# Patient Record
Sex: MALE | Race: WHITE | Employment: FULL TIME | URBAN - METROPOLITAN AREA
[De-identification: names, ages, dates, MRNs, and addresses within clinical notes are randomized per-mention and may not be internally consistent; named-entity substitution may affect disease eponyms.]

---

## 2017-06-09 ENCOUNTER — OFFICE VISIT - HEALTHEAST (OUTPATIENT)
Dept: FAMILY MEDICINE | Facility: CLINIC | Age: 22
End: 2017-06-09

## 2017-06-09 ENCOUNTER — HOSPITAL ENCOUNTER (OUTPATIENT)
Dept: ULTRASOUND IMAGING | Facility: HOSPITAL | Age: 22
Discharge: HOME | End: 2017-06-09
Attending: FAMILY MEDICINE

## 2017-06-09 ENCOUNTER — COMMUNICATION - HEALTHEAST (OUTPATIENT)
Dept: TELEHEALTH | Facility: CLINIC | Age: 22
End: 2017-06-09

## 2017-06-09 ENCOUNTER — RECORDS - HEALTHEAST (OUTPATIENT)
Dept: GENERAL RADIOLOGY | Facility: CLINIC | Age: 22
End: 2017-06-09

## 2017-06-09 DIAGNOSIS — R11.2 NAUSEA WITH VOMITING, UNSPECIFIED: ICD-10-CM

## 2017-06-09 DIAGNOSIS — R10.9 UNSPECIFIED ABDOMINAL PAIN: ICD-10-CM

## 2017-06-09 DIAGNOSIS — R63.4 ABNORMAL WEIGHT LOSS: ICD-10-CM

## 2017-06-09 DIAGNOSIS — R11.2 NAUSEA & VOMITING: ICD-10-CM

## 2017-06-09 DIAGNOSIS — R10.9 ABDOMINAL PAIN: ICD-10-CM

## 2017-06-09 DIAGNOSIS — R63.4 WEIGHT LOSS: ICD-10-CM

## 2017-06-09 ASSESSMENT — MIFFLIN-ST. JEOR: SCORE: 1930.19

## 2017-06-14 ENCOUNTER — COMMUNICATION - HEALTHEAST (OUTPATIENT)
Dept: FAMILY MEDICINE | Facility: CLINIC | Age: 22
End: 2017-06-14

## 2017-06-14 DIAGNOSIS — R63.4 WEIGHT LOSS, UNINTENTIONAL: ICD-10-CM

## 2018-09-13 ENCOUNTER — OFFICE VISIT - HEALTHEAST (OUTPATIENT)
Dept: FAMILY MEDICINE | Facility: CLINIC | Age: 23
End: 2018-09-13

## 2018-09-13 ENCOUNTER — COMMUNICATION - HEALTHEAST (OUTPATIENT)
Dept: TELEHEALTH | Facility: CLINIC | Age: 23
End: 2018-09-13

## 2018-09-13 DIAGNOSIS — M79.671 RIGHT FOOT PAIN: ICD-10-CM

## 2018-09-13 ASSESSMENT — MIFFLIN-ST. JEOR: SCORE: 2170.6

## 2018-12-20 ENCOUNTER — OFFICE VISIT - HEALTHEAST (OUTPATIENT)
Dept: FAMILY MEDICINE | Facility: CLINIC | Age: 23
End: 2018-12-20

## 2018-12-20 DIAGNOSIS — R11.10 VOMITING AND DIARRHEA: ICD-10-CM

## 2018-12-20 DIAGNOSIS — R19.7 VOMITING AND DIARRHEA: ICD-10-CM

## 2018-12-20 LAB
ANION GAP SERPL CALCULATED.3IONS-SCNC: 12 MMOL/L (ref 5–18)
BASOPHILS # BLD AUTO: 0 THOU/UL (ref 0–0.2)
BASOPHILS NFR BLD AUTO: 1 % (ref 0–2)
BUN SERPL-MCNC: 17 MG/DL (ref 8–22)
CHLORIDE BLD-SCNC: 106 MMOL/L (ref 98–107)
CO2 SERPL-SCNC: 25 MMOL/L (ref 22–31)
CREAT SERPL-MCNC: 0.9 MG/DL (ref 0.8–1.5)
EOSINOPHIL # BLD AUTO: 0.2 THOU/UL (ref 0–0.4)
EOSINOPHIL NFR BLD AUTO: 3 % (ref 0–6)
ERYTHROCYTE [DISTWIDTH] IN BLOOD BY AUTOMATED COUNT: 10.8 % (ref 11–14.5)
GLUCOSE BLD-MCNC: 107 MG/DL (ref 70–125)
HCT VFR BLD AUTO: 45.2 % (ref 40–54)
HGB BLD-MCNC: 15.9 G/DL (ref 14–18)
LIPASE SERPL-CCNC: 15 U/L (ref 0–52)
LYMPHOCYTES # BLD AUTO: 1.7 THOU/UL (ref 0.8–4.4)
LYMPHOCYTES NFR BLD AUTO: 27 % (ref 20–40)
MCH RBC QN AUTO: 33.6 PG (ref 27–34)
MCHC RBC AUTO-ENTMCNC: 35.2 G/DL (ref 32–36)
MCV RBC AUTO: 95 FL (ref 80–100)
MONOCYTES # BLD AUTO: 0.4 THOU/UL (ref 0–0.9)
MONOCYTES NFR BLD AUTO: 6 % (ref 2–10)
NEUTROPHILS # BLD AUTO: 3.9 THOU/UL (ref 2–7.7)
NEUTROPHILS NFR BLD AUTO: 63 % (ref 50–70)
PLATELET # BLD AUTO: 228 THOU/UL (ref 140–440)
PMV BLD AUTO: 7.9 FL (ref 7–10)
POTASSIUM BLD-SCNC: 4.5 MMOL/L (ref 3.5–5.5)
RBC # BLD AUTO: 4.74 MILL/UL (ref 4.4–6.2)
SODIUM SERPL-SCNC: 143 MMOL/L (ref 136–145)
WBC: 6.3 THOU/UL (ref 4–11)

## 2019-02-14 ENCOUNTER — COMMUNICATION - HEALTHEAST (OUTPATIENT)
Dept: FAMILY MEDICINE | Facility: CLINIC | Age: 24
End: 2019-02-14

## 2019-03-21 ENCOUNTER — COMMUNICATION - HEALTHEAST (OUTPATIENT)
Dept: FAMILY MEDICINE | Facility: CLINIC | Age: 24
End: 2019-03-21

## 2019-04-02 ENCOUNTER — OFFICE VISIT - HEALTHEAST (OUTPATIENT)
Dept: FAMILY MEDICINE | Facility: CLINIC | Age: 24
End: 2019-04-02

## 2019-04-02 DIAGNOSIS — Z63.79 OTHER STRESSFUL LIFE EVENTS AFFECTING FAMILY AND HOUSEHOLD: ICD-10-CM

## 2019-04-02 DIAGNOSIS — J45.990 EXERCISE-INDUCED ASTHMA: ICD-10-CM

## 2019-04-02 DIAGNOSIS — E66.01 CLASS 3 SEVERE OBESITY WITHOUT SERIOUS COMORBIDITY IN ADULT, UNSPECIFIED BMI, UNSPECIFIED OBESITY TYPE (H): ICD-10-CM

## 2019-04-02 DIAGNOSIS — F32.0 CURRENT MILD EPISODE OF MAJOR DEPRESSIVE DISORDER WITHOUT PRIOR EPISODE (H): ICD-10-CM

## 2019-04-02 DIAGNOSIS — Z00.00 ANNUAL PHYSICAL EXAM: ICD-10-CM

## 2019-04-02 DIAGNOSIS — J45.909 ASTHMA: ICD-10-CM

## 2019-04-02 DIAGNOSIS — K59.09 OTHER CONSTIPATION: ICD-10-CM

## 2019-04-02 DIAGNOSIS — E66.813 CLASS 3 SEVERE OBESITY WITHOUT SERIOUS COMORBIDITY IN ADULT, UNSPECIFIED BMI, UNSPECIFIED OBESITY TYPE (H): ICD-10-CM

## 2019-04-02 RX ORDER — OMEGA-3S/DHA/EPA/FISH OIL/D3 300MG-1000
500 CAPSULE ORAL DAILY
Status: SHIPPED | COMMUNITY
Start: 2019-04-02 | End: 2023-01-09

## 2019-04-02 RX ORDER — ALBUTEROL SULFATE 90 UG/1
1-2 AEROSOL, METERED RESPIRATORY (INHALATION) EVERY 4 HOURS PRN
Qty: 1 INHALER | Refills: 11 | Status: SHIPPED | OUTPATIENT
Start: 2019-04-02 | End: 2023-01-09

## 2019-04-02 ASSESSMENT — MIFFLIN-ST. JEOR: SCORE: 2168.53

## 2019-04-22 ENCOUNTER — COMMUNICATION - HEALTHEAST (OUTPATIENT)
Dept: FAMILY MEDICINE | Facility: CLINIC | Age: 24
End: 2019-04-22

## 2019-09-23 ENCOUNTER — COMMUNICATION - HEALTHEAST (OUTPATIENT)
Dept: FAMILY MEDICINE | Facility: CLINIC | Age: 24
End: 2019-09-23

## 2020-01-23 ENCOUNTER — OFFICE VISIT - HEALTHEAST (OUTPATIENT)
Dept: FAMILY MEDICINE | Facility: CLINIC | Age: 25
End: 2020-01-23

## 2020-01-23 DIAGNOSIS — J11.1 INFLUENZA-LIKE ILLNESS: ICD-10-CM

## 2020-01-23 ASSESSMENT — MIFFLIN-ST. JEOR: SCORE: 2229.29

## 2020-03-13 ENCOUNTER — RECORDS - HEALTHEAST (OUTPATIENT)
Dept: ADMINISTRATIVE | Facility: OTHER | Age: 25
End: 2020-03-13

## 2021-04-05 ENCOUNTER — HOSPITAL ENCOUNTER (EMERGENCY)
Facility: CLINIC | Age: 26
Discharge: HOME | End: 2021-04-05
Attending: EMERGENCY MEDICINE | Admitting: EMERGENCY MEDICINE

## 2021-04-05 ENCOUNTER — RECORDS - HEALTHEAST (OUTPATIENT)
Dept: ADMINISTRATIVE | Facility: OTHER | Age: 26
End: 2021-04-05

## 2021-04-05 VITALS
BODY MASS INDEX: 35.78 KG/M2 | HEIGHT: 73 IN | RESPIRATION RATE: 20 BRPM | TEMPERATURE: 98 F | HEART RATE: 71 BPM | DIASTOLIC BLOOD PRESSURE: 85 MMHG | SYSTOLIC BLOOD PRESSURE: 117 MMHG | OXYGEN SATURATION: 100 % | WEIGHT: 270 LBS

## 2021-04-05 DIAGNOSIS — R55 SYNCOPE, UNSPECIFIED SYNCOPE TYPE: ICD-10-CM

## 2021-04-05 LAB
ALBUMIN SERPL-MCNC: 3.8 G/DL (ref 3.4–5)
ALP SERPL-CCNC: 87 U/L (ref 40–150)
ALT SERPL W P-5'-P-CCNC: 43 U/L (ref 0–70)
ANION GAP SERPL CALCULATED.3IONS-SCNC: 4 MMOL/L (ref 3–14)
AST SERPL W P-5'-P-CCNC: 28 U/L (ref 0–45)
BASOPHILS # BLD AUTO: 0.1 10E9/L (ref 0–0.2)
BASOPHILS NFR BLD AUTO: 1.1 %
BILIRUB SERPL-MCNC: 1.1 MG/DL (ref 0.2–1.3)
BUN SERPL-MCNC: 16 MG/DL (ref 7–30)
CALCIUM SERPL-MCNC: 9.1 MG/DL (ref 8.5–10.1)
CHLORIDE SERPL-SCNC: 107 MMOL/L (ref 94–109)
CO2 SERPL-SCNC: 25 MMOL/L (ref 20–32)
CREAT SERPL-MCNC: 0.94 MG/DL (ref 0.66–1.25)
DIFFERENTIAL METHOD BLD: NORMAL
EOSINOPHIL # BLD AUTO: 0.1 10E9/L (ref 0–0.7)
EOSINOPHIL NFR BLD AUTO: 1.4 %
ERYTHROCYTE [DISTWIDTH] IN BLOOD BY AUTOMATED COUNT: 12.5 % (ref 10–15)
GFR SERPL CREATININE-BSD FRML MDRD: >90 ML/MIN/{1.73_M2}
GLUCOSE SERPL-MCNC: 80 MG/DL (ref 70–99)
HCT VFR BLD AUTO: 48 % (ref 40–53)
HGB BLD-MCNC: 16.6 G/DL (ref 13.3–17.7)
IMM GRANULOCYTES # BLD: 0 10E9/L (ref 0–0.4)
IMM GRANULOCYTES NFR BLD: 0.1 %
LYMPHOCYTES # BLD AUTO: 3 10E9/L (ref 0.8–5.3)
LYMPHOCYTES NFR BLD AUTO: 32 %
MCH RBC QN AUTO: 31.7 PG (ref 26.5–33)
MCHC RBC AUTO-ENTMCNC: 34.6 G/DL (ref 31.5–36.5)
MCV RBC AUTO: 92 FL (ref 78–100)
MONOCYTES # BLD AUTO: 0.7 10E9/L (ref 0–1.3)
MONOCYTES NFR BLD AUTO: 8 %
NEUTROPHILS # BLD AUTO: 5.3 10E9/L (ref 1.6–8.3)
NEUTROPHILS NFR BLD AUTO: 57.4 %
NRBC # BLD AUTO: 0 10*3/UL
NRBC BLD AUTO-RTO: 0 /100
PLATELET # BLD AUTO: 281 10E9/L (ref 150–450)
POTASSIUM SERPL-SCNC: 4 MMOL/L (ref 3.4–5.3)
PROT SERPL-MCNC: 7.7 G/DL (ref 6.8–8.8)
RBC # BLD AUTO: 5.24 10E12/L (ref 4.4–5.9)
SODIUM SERPL-SCNC: 137 MMOL/L (ref 133–144)
WBC # BLD AUTO: 9.2 10E9/L (ref 4–11)

## 2021-04-05 PROCEDURE — 99284 EMERGENCY DEPT VISIT MOD MDM: CPT | Mod: 25 | Performed by: EMERGENCY MEDICINE

## 2021-04-05 PROCEDURE — 80053 COMPREHEN METABOLIC PANEL: CPT | Performed by: EMERGENCY MEDICINE

## 2021-04-05 PROCEDURE — 85025 COMPLETE CBC W/AUTO DIFF WBC: CPT | Performed by: EMERGENCY MEDICINE

## 2021-04-05 PROCEDURE — 93005 ELECTROCARDIOGRAM TRACING: CPT | Performed by: EMERGENCY MEDICINE

## 2021-04-05 PROCEDURE — 99284 EMERGENCY DEPT VISIT MOD MDM: CPT | Performed by: EMERGENCY MEDICINE

## 2021-04-05 PROCEDURE — 93010 ELECTROCARDIOGRAM REPORT: CPT | Performed by: EMERGENCY MEDICINE

## 2021-04-05 ASSESSMENT — MIFFLIN-ST. JEOR: SCORE: 2263.59

## 2021-04-05 NOTE — LETTER
April 5, 2021      To Whom It May Concern:      John Jeter was seen in our Emergency Department today, 04/05/21.  I expect his condition to improve over the next 3 days.  He may return to work/school when improved.    Sincerely,        Clement Pope MD        
No

## 2021-04-05 NOTE — ED TRIAGE NOTES
"THis afternoon taking leash off dog stood up and passed out. States he \"aimee held onto the wall and went down slowly. States he was out for about 30 minutes. Friend found him. Denies neck pain, having left shoulder, arm right leg pain.  "

## 2021-04-06 LAB — INTERPRETATION ECG - MUSE: NORMAL

## 2021-04-06 NOTE — DISCHARGE INSTRUCTIONS
Please make an appointment to follow up with Your Primary Care Provider as soon as possible.  Results for orders placed or performed during the hospital encounter of 04/05/21   Comprehensive metabolic panel     Status: None   Result Value Ref Range    Sodium 137 133 - 144 mmol/L    Potassium 4.0 3.4 - 5.3 mmol/L    Chloride 107 94 - 109 mmol/L    Carbon Dioxide 25 20 - 32 mmol/L    Anion Gap 4 3 - 14 mmol/L    Glucose 80 70 - 99 mg/dL    Urea Nitrogen 16 7 - 30 mg/dL    Creatinine 0.94 0.66 - 1.25 mg/dL    GFR Estimate >90 >60 mL/min/[1.73_m2]    GFR Estimate If Black >90 >60 mL/min/[1.73_m2]    Calcium 9.1 8.5 - 10.1 mg/dL    Bilirubin Total 1.1 0.2 - 1.3 mg/dL    Albumin 3.8 3.4 - 5.0 g/dL    Protein Total 7.7 6.8 - 8.8 g/dL    Alkaline Phosphatase 87 40 - 150 U/L    ALT 43 0 - 70 U/L    AST 28 0 - 45 U/L   CBC with platelets differential     Status: None   Result Value Ref Range    WBC 9.2 4.0 - 11.0 10e9/L    RBC Count 5.24 4.4 - 5.9 10e12/L    Hemoglobin 16.6 13.3 - 17.7 g/dL    Hematocrit 48.0 40.0 - 53.0 %    MCV 92 78 - 100 fl    MCH 31.7 26.5 - 33.0 pg    MCHC 34.6 31.5 - 36.5 g/dL    RDW 12.5 10.0 - 15.0 %    Platelet Count 281 150 - 450 10e9/L    Diff Method Automated Method     % Neutrophils 57.4 %    % Lymphocytes 32.0 %    % Monocytes 8.0 %    % Eosinophils 1.4 %    % Basophils 1.1 %    % Immature Granulocytes 0.1 %    Nucleated RBCs 0 0 /100    Absolute Neutrophil 5.3 1.6 - 8.3 10e9/L    Absolute Lymphocytes 3.0 0.8 - 5.3 10e9/L    Absolute Monocytes 0.7 0.0 - 1.3 10e9/L    Absolute Eosinophils 0.1 0.0 - 0.7 10e9/L    Absolute Basophils 0.1 0.0 - 0.2 10e9/L    Abs Immature Granulocytes 0.0 0 - 0.4 10e9/L    Absolute Nucleated RBC 0.0    EKG 12-lead, tracing only     Status: None (Preliminary result)   Result Value Ref Range    Interpretation ECG Click View Image link to view waveform and result

## 2021-04-06 NOTE — ED PROVIDER NOTES
"    [place] EMERGENCY DEPARTMENT ([facility])  4/05/21      History     Chief Complaint   Patient presents with     Loss of Consciousness     The history is provided by the patient, medical records and a friend.     John Jeter is a 25 year old male with a medical history significant for depression and  exercise-induced asthma, who presents to the emergency department for evaluation following a syncopal episode in which patient was unconscious for about 30 minutes.  Patient states he bent down to take the leash off of his dog after going on a walk and stood up with the intent to go back to work prior to fainting.  Patient notes he woke up to his friend running through the door saying his name.  Patient notes this has happened previously (about 4 years ago) and was worked up with a CT, MRI, EKG, physicality tests, and EEG through [facility].  Patient denies any episodes of fainting since then.  He notes episodes in which he will \"have a faint feeling\" upon standing up after sitting down.  He states that his \"brain sometimes gets fuzzy\".  His friend notes that he has been disoriented for the past few days. He feels confused at work and  \"does not feel like he is all there\". Patient states he felt like everything was processing slower and he was not moving at his normal pace.  He denies fever, cough, recent colds, or diarrhea.      Per review of the patient's medical record, they were seen at [facility]'s ED on 4/20/2016 for evaluation of a syncopal episode. Per patient's parents he was previously having syncopal episodes like this approximately every 1-2 weeks after his postconcussive syndrome. Patient was worked up with an EKG which did not show any evidence of long QT or arrhythmia. Patient was discharged in stable improved condition.       US Abdomen Complete 6/9/2017  FINDINGS: Normal abdominal ultrasound.    XR Chest PA and Lateral 6/9/2017  FINDINGS: Negative chest.    XR Abdomen AP " "6/9/2017  FINDINGS: Negative abdomen. Bowel gas pattern is normal. Nothing for obstruction. No evidence for renal stones.    History reviewed. No pertinent past medical history.    History reviewed. No pertinent surgical history.    No family history on file.    Social History     Tobacco Use     Smoking status: Current Every Day Smoker     Smokeless tobacco: Never Used   Substance Use Topics     Alcohol use: Yes     Comment: rare       No current facility-administered medications for this encounter.      No current outpatient medications on file.      Not on File       Review of Systems  A complete review of systems was performed with pertinent positives and negatives noted in the HPI, and all other systems negative.    Physical Exam   BP: 127/81  Pulse: 74  Temp: 98  F (36.7  C)  Resp: 14  Height: 185.4 cm (6' 1\")  Weight: 122.5 kg (270 lb)  SpO2: 96 %  Physical Exam  Vitals signs and nursing note reviewed.   Constitutional:       General: He is not in acute distress.     Appearance: He is well-developed. He is not ill-appearing, toxic-appearing or diaphoretic.      Comments: Comfortably resting, lying in bed, NAD, nondiaphoretic, lucid, fully conversant, no  respiratory distress, alert and oriented.     HENT:      Head: Normocephalic and atraumatic.   Eyes:      General: No scleral icterus.     Pupils: Pupils are equal, round, and reactive to light.   Neck:      Musculoskeletal: Normal range of motion and neck supple.   Cardiovascular:      Rate and Rhythm: Normal rate.      Heart sounds: Normal heart sounds.   Pulmonary:      Effort: No respiratory distress.      Breath sounds: Normal breath sounds.   Abdominal:      General: Bowel sounds are normal.      Palpations: Abdomen is soft.      Tenderness: There is no abdominal tenderness.   Musculoskeletal:         General: No tenderness.   Skin:     General: Skin is warm and dry.      Findings: No rash.   Neurological:      General: No focal deficit present.      " Mental Status: He is alert and oriented to person, place, and time. Mental status is at baseline.      Comments: Patient nerves II through XII were tested and intact.  Extraocular muscles intact, without any nystagmus.  Strength in all 4 extremities with 5/5 and sensation was intact in all extremities.   gait normal.           ED Course     9:34 PM  The patient was seen and examined by Clement Pope MD in Room ED21.     Procedures             EKG Interpretation:      Interpreted by Clement Pope MD  Time reviewed:   Symptoms at time of EKG: Asymptomatic  Rhythm: normal sinus   Rate: normal  Axis: normal  Ectopy: none  Conduction: normal  ST Segments/ T Waves: No ST-T wave changes  Q Waves: none  Comparison to prior: No old EKG available    Clinical Impression: normal EKG             Results for orders placed or performed during the hospital encounter of 04/05/21   Comprehensive metabolic panel     Status: None   Result Value Ref Range    Sodium 137 133 - 144 mmol/L    Potassium 4.0 3.4 - 5.3 mmol/L    Chloride 107 94 - 109 mmol/L    Carbon Dioxide 25 20 - 32 mmol/L    Anion Gap 4 3 - 14 mmol/L    Glucose 80 70 - 99 mg/dL    Urea Nitrogen 16 7 - 30 mg/dL    Creatinine 0.94 0.66 - 1.25 mg/dL    GFR Estimate >90 >60 mL/min/[1.73_m2]    GFR Estimate If Black >90 >60 mL/min/[1.73_m2]    Calcium 9.1 8.5 - 10.1 mg/dL    Bilirubin Total 1.1 0.2 - 1.3 mg/dL    Albumin 3.8 3.4 - 5.0 g/dL    Protein Total 7.7 6.8 - 8.8 g/dL    Alkaline Phosphatase 87 40 - 150 U/L    ALT 43 0 - 70 U/L    AST 28 0 - 45 U/L   CBC with platelets differential     Status: None   Result Value Ref Range    WBC 9.2 4.0 - 11.0 10e9/L    RBC Count 5.24 4.4 - 5.9 10e12/L    Hemoglobin 16.6 13.3 - 17.7 g/dL    Hematocrit 48.0 40.0 - 53.0 %    MCV 92 78 - 100 fl    MCH 31.7 26.5 - 33.0 pg    MCHC 34.6 31.5 - 36.5 g/dL    RDW 12.5 10.0 - 15.0 %    Platelet Count 281 150 - 450 10e9/L    Diff Method Automated Method     % Neutrophils 57.4 %    %  Lymphocytes 32.0 %    % Monocytes 8.0 %    % Eosinophils 1.4 %    % Basophils 1.1 %    % Immature Granulocytes 0.1 %    Nucleated RBCs 0 0 /100    Absolute Neutrophil 5.3 1.6 - 8.3 10e9/L    Absolute Lymphocytes 3.0 0.8 - 5.3 10e9/L    Absolute Monocytes 0.7 0.0 - 1.3 10e9/L    Absolute Eosinophils 0.1 0.0 - 0.7 10e9/L    Absolute Basophils 0.1 0.0 - 0.2 10e9/L    Abs Immature Granulocytes 0.0 0 - 0.4 10e9/L    Absolute Nucleated RBC 0.0    EKG 12-lead, tracing only     Status: None (Preliminary result)   Result Value Ref Range    Interpretation ECG Click View Image link to view waveform and result      Medications - No data to display     Assessments & Plan (with Medical Decision Making)   This is a 25-year-old male with a significant past medical history of recurrent syncopal episodes presents today after also having recurrent episode of syncope.  Here he is vitally stable.  ECG is normal sinus rhythm without signs of ectopy or ischemia.  His QTC is within normal limits and his QRS is not widened.  His neuro exam is completely unremarkable.  His labs are also reviewed.  I do not see any acute processes at this time however the patient does have a history of syncope which had a very extensive work-up in the past.  I do recommend that he return to his primary care provider to let them know that he is having similar symptoms.  Pt was discharged home/self-care.  PT was provided written discharge instructions. Additionally verbal instructions were given and discussed with patient.  PT was asked to return to the ED immediately for any new or concerning symptoms.  Pt was in agreement, endorsed understanding, and questions were answered.  I have reviewed the nursing notes. I have reviewed the findings, diagnosis, plan and need for follow up with the patient.    New Prescriptions    No medications on file       Final diagnoses:   Syncope, unspecified syncope type     I, Gali Booker, am serving as a trained medical  scribe to document services personally performed by Clement Pope MD based on the provider's statements to me on April 5, 2021.  This document has been checked and approved by the attending provider.    I, Clement Pope MD, was physically present and have reviewed and verified the accuracy of this note documented by Gali Booker, medical scribe.      --  Clement Pope MD  [facility] EMERGENCY DEPARTMENT  4/5/2021     Clement Pope MD  04/05/21 [id number]

## 2021-04-26 ENCOUNTER — OFFICE VISIT - HEALTHEAST (OUTPATIENT)
Dept: FAMILY MEDICINE | Facility: CLINIC | Age: 26
End: 2021-04-26

## 2021-04-26 DIAGNOSIS — F90.9 ATTENTION DEFICIT HYPERACTIVITY DISORDER (ADHD), UNSPECIFIED ADHD TYPE: ICD-10-CM

## 2021-04-26 DIAGNOSIS — R19.8 IRREGULAR BOWEL HABITS: ICD-10-CM

## 2021-04-26 DIAGNOSIS — F32.0 CURRENT MILD EPISODE OF MAJOR DEPRESSIVE DISORDER, UNSPECIFIED WHETHER RECURRENT (H): ICD-10-CM

## 2021-04-26 DIAGNOSIS — R55 SYNCOPE, UNSPECIFIED SYNCOPE TYPE: ICD-10-CM

## 2021-04-26 DIAGNOSIS — J45.990 EXERCISE-INDUCED ASTHMA: ICD-10-CM

## 2021-04-26 ASSESSMENT — MIFFLIN-ST. JEOR: SCORE: 2375.36

## 2021-04-26 ASSESSMENT — PATIENT HEALTH QUESTIONNAIRE - PHQ9: SUM OF ALL RESPONSES TO PHQ QUESTIONS 1-9: 5

## 2021-05-20 ENCOUNTER — RECORDS - HEALTHEAST (OUTPATIENT)
Dept: ADMINISTRATIVE | Facility: OTHER | Age: 26
End: 2021-05-20

## 2021-05-20 ENCOUNTER — OFFICE VISIT - HEALTHEAST (OUTPATIENT)
Dept: FAMILY MEDICINE | Facility: CLINIC | Age: 26
End: 2021-05-20

## 2021-05-20 DIAGNOSIS — F32.0 CURRENT MILD EPISODE OF MAJOR DEPRESSIVE DISORDER, UNSPECIFIED WHETHER RECURRENT (H): ICD-10-CM

## 2021-05-20 DIAGNOSIS — Z82.49 FAMILY HISTORY OF LEFT VENTRICULAR NON-COMPACTION (LVNC): ICD-10-CM

## 2021-05-20 DIAGNOSIS — Z13.1 SCREENING FOR DIABETES MELLITUS: ICD-10-CM

## 2021-05-20 DIAGNOSIS — Z23 NEED FOR TD VACCINE: ICD-10-CM

## 2021-05-20 DIAGNOSIS — R55 SYNCOPE, UNSPECIFIED SYNCOPE TYPE: ICD-10-CM

## 2021-05-20 DIAGNOSIS — Z83.3 FAMILY HISTORY OF DIABETES MELLITUS: ICD-10-CM

## 2021-05-20 DIAGNOSIS — R19.5 MUCUS IN STOOL: ICD-10-CM

## 2021-05-20 DIAGNOSIS — E55.9 VITAMIN D DEFICIENCY: ICD-10-CM

## 2021-05-20 DIAGNOSIS — H53.9 VISION CHANGES: ICD-10-CM

## 2021-05-20 DIAGNOSIS — R10.11 RUQ ABDOMINAL PAIN: ICD-10-CM

## 2021-05-20 DIAGNOSIS — Z00.00 ANNUAL PHYSICAL EXAM: ICD-10-CM

## 2021-05-20 DIAGNOSIS — R19.8 IRREGULAR BOWEL HABITS: ICD-10-CM

## 2021-05-20 DIAGNOSIS — Z11.59 NEED FOR HEPATITIS C SCREENING TEST: ICD-10-CM

## 2021-05-20 DIAGNOSIS — Z11.59 NEED FOR HEPATITIS B SCREENING TEST: ICD-10-CM

## 2021-05-20 DIAGNOSIS — Z11.4 ENCOUNTER FOR SCREENING FOR HIV: ICD-10-CM

## 2021-05-20 LAB
ALBUMIN SERPL-MCNC: 3.5 G/DL (ref 3.5–5)
ALP SERPL-CCNC: 86 U/L (ref 45–120)
ALT SERPL W P-5'-P-CCNC: <45 U/L (ref 0–45)
ANION GAP SERPL CALCULATED.3IONS-SCNC: 9 MMOL/L (ref 5–18)
AST SERPL W P-5'-P-CCNC: 18 U/L (ref 0–40)
BILIRUB SERPL-MCNC: 0.9 MG/DL (ref 0–1)
BUN SERPL-MCNC: 11 MG/DL (ref 8–22)
CALCIUM SERPL-MCNC: 8.7 MG/DL (ref 8.5–10.5)
CHLORIDE BLD-SCNC: 109 MMOL/L (ref 98–107)
CHOLEST SERPL-MCNC: 143 MG/DL
CK SERPL-CCNC: 250 U/L (ref 30–190)
CO2 SERPL-SCNC: 22 MMOL/L (ref 22–31)
CREAT SERPL-MCNC: 0.79 MG/DL (ref 0.7–1.3)
FASTING STATUS PATIENT QL REPORTED: NO
GFR SERPL CREATININE-BSD FRML MDRD: >60 ML/MIN/1.73M2
GLUCOSE BLD-MCNC: 90 MG/DL (ref 70–125)
HBA1C MFR BLD: 5.3 %
HDLC SERPL-MCNC: 45 MG/DL
HIV 1+2 AB+HIV1 P24 AG SERPL QL IA: NEGATIVE
LDLC SERPL CALC-MCNC: 86 MG/DL
LIPASE SERPL-CCNC: 20 U/L (ref 0–52)
POTASSIUM BLD-SCNC: 4.4 MMOL/L (ref 3.5–5)
PROT SERPL-MCNC: 6.2 G/DL (ref 6–8)
SODIUM SERPL-SCNC: 140 MMOL/L (ref 136–145)
TRIGL SERPL-MCNC: 59 MG/DL
TSH SERPL DL<=0.005 MIU/L-ACNC: 0.56 UIU/ML (ref 0.3–5)

## 2021-05-20 ASSESSMENT — MIFFLIN-ST. JEOR: SCORE: 2300.16

## 2021-05-21 ENCOUNTER — RECORDS - HEALTHEAST (OUTPATIENT)
Dept: ADMINISTRATIVE | Facility: OTHER | Age: 26
End: 2021-05-21

## 2021-05-21 LAB
25(OH)D3 SERPL-MCNC: 15.1 NG/ML (ref 30–80)
HBV CORE AB SERPL QL IA: NEGATIVE
HBV SURFACE AG SERPL QL IA: NEGATIVE
HCV AB SERPL QL IA: NEGATIVE
HEPATITIS B SURFACE ANTIBODY LHE- HISTORICAL: POSITIVE

## 2021-05-27 VITALS
HEART RATE: 68 BPM | SYSTOLIC BLOOD PRESSURE: 106 MMHG | DIASTOLIC BLOOD PRESSURE: 74 MMHG | TEMPERATURE: 98.2 F | RESPIRATION RATE: 18 BRPM

## 2021-05-27 VITALS — HEIGHT: 72 IN | BODY MASS INDEX: 37.67 KG/M2 | WEIGHT: 281.13 LBS | BODY MASS INDEX: 37.09 KG/M2

## 2021-05-27 NOTE — PROGRESS NOTES
"MALE PREVENTATIVE EXAM    Assessment and Plan:   Annual Exam -   Will have him RTC for labs in a few months when he has insurance.       Exercise Induced Asthma  ACT 18  Action plan filled out today  Refilled ventolin inhaler     Major depression   Filled out form for \"reasonable accomodation\" for an animal . See scanned form  He will schedule psychotherapy once he has insurance.      Eczema - resolved with avoiding triggers and regular use of lotion and hydrocorticosone cream    Constipation and possible hemorrhoids  Discussed home remedies and OTC options while he is uninsured.   Prune juice, pineapple juice, metamucil, benefiber  Prep H cream for hemorrhoids  Will reassess at f/u appt          Next follow up:  No Follow-up on file.    Immunization Review  Adult Imm Review: Declines immunizations today  BMI: 33.6      I discussed the following with the patient:   Adult Healthy Living: Importance of regular exercise  Healthy nutrition  Stress management      Subjective:   Chief Complaint: John Jeter is an 23 y.o. male here for a preventative health visit.     HPI:      Asthma   ACT 18 today  Exercise triggers sx      depression  Feeling depressed and anxious about stressors in the family. His grandmother had several strokes and his mother had to take over responsibility for the teenage siblings. His uncle is an alcoholic and having psychosis symptoms and despite ER visits and trying to access mental health services in [facility], he has struggled to get the help he needs. John has been trying to help with the family issues. 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 He is currently working as manager at a [employer]. He has been sleeping poorly with trouble falling asleep. He has apathy and unmotivated to do anything. He has anhedonia and not enjoying his friends and video games that he usually enjoys. He had an episode a few months ago of " drinking hard liquor and then blacking out and was told he got violent and passed out, so he stopped drinking. He lives with his girlfriend and she has concerns about his depression. He tries to eat healthy and exercise to help. He wants to got to therapy but waiting until he can get insurance, which is possible in a few months with his job. He also wants to have an animal  to help with this stress.He is wanting to stop his use of pot as a coping strategy.  He currently has an dog that is trained as a  and it is helping but he cannot keep it in his apartment, unless a medical form is filled out. He is not interested in starting medications unless things worsen. Denies suicidal thoughts.     PHQ9 = 9, very difficult    Eczema - improved significantly by avoiding triggers    Possible hemorrhoids from intermittent constipation  He drinks lots of water and eating more fruits and vegetables.   Maroon and dark streaks in his stool periodically.       Healthy Habits  Are you taking a daily aspirin? No  Do you typically exercising at least 40 min, 3-4 times per week?  Yes  Do you usually eat at least 4 servings of fruit and vegetables a day, include whole grains and fiber and avoid regularly eating high fat foods? Yes  Have you had an eye exam in the past two years? Yes  Do you see a dentist twice per year? Yes  Do you have any concerns regarding sleep? YES    Safety Screen  If you own firearms, are they secured in a locked gun cabinet or with trigger locks? Does not own firearms  Do you feel you are safe where you are living?: Yes (4/2/2019 10:57 AM)  Do you feel you are safe in your relationship(s)?: Yes (4/2/2019 10:57 AM)      Review of Systems:  Please see above.  The rest of the review of systems are negative for all systems.     Cancer Screening     Patient has no health maintenance due at this time          Patient Care Team:  Tiffany Shah MD as PCP - General        History     Reviewed By  "Date/Time Sections Reviewed    Humaira Lee CMA 4/2/2019 11:04 AM Tobacco, Alcohol, Drug Use, Sexual Activity    Humaira Lee [facility] 4/2/2019 11:03 AM Tobacco            Objective:   Vital Signs:   Visit Vitals  /68   Pulse 90   Temp 99.3  F (37.4  C) (Oral)   Resp 14   Ht 6' 0.44\" (1.84 m)   Wt (!) 251 lb (113.9 kg)   SpO2 96%   BMI 33.63 kg/m           PHYSICAL EXAM  OBJECTIVE:  Vitals listed above within normal limits  General appearance: well groomed, pleasant, well hydrated, nontoxic appearing  ENT: PERRL, throat clear  Neck: neck supple, no lymphadenopathy, no thyromegaly  Lungs: lungs clear to auscultation bilaterally, no wheezes or rhonchi  Heart: regular rate and rhythm, no murmurs, rubs or gallops  Abdomen: soft, nontender  Neuro: no focal deficits, CN II-XII grossly intact, alert and oriented  Psych:  mood stable, appears to have good insight and judgment          "

## 2021-05-28 ASSESSMENT — ASTHMA QUESTIONNAIRES: ACT_TOTALSCORE: 24

## 2021-05-31 VITALS — BODY MASS INDEX: 27.09 KG/M2 | WEIGHT: 200 LBS | HEIGHT: 72 IN

## 2021-06-01 ENCOUNTER — RECORDS - HEALTHEAST (OUTPATIENT)
Dept: ADMINISTRATIVE | Facility: CLINIC | Age: 26
End: 2021-06-01

## 2021-06-02 VITALS — WEIGHT: 251 LBS | BODY MASS INDEX: 34 KG/M2 | HEIGHT: 72 IN

## 2021-06-02 VITALS — BODY MASS INDEX: 32.42 KG/M2 | WEIGHT: 252.5 LBS

## 2021-06-02 VITALS — HEIGHT: 74 IN | BODY MASS INDEX: 31.57 KG/M2 | WEIGHT: 246 LBS

## 2021-06-11 NOTE — PROGRESS NOTES
OFFICE VISIT NOTE      Assessment & Plan   John Jeter is a 21 y.o. male who does not come to the doctor but who has a significant story:  100# unintentional weight loss in the past year  Nausea x 2 years  Occasional black and tarry stools  Wait for labs + get abd u/s; might need upper endoscopy    I spent some time explaining what things I'm worried about with him AND encouraging him to follow up to figure out what is going on.    His girlfriend who accompanied him is supportive of him pursuing care/help.    Diagnoses and all orders for this visit:    Abdominal pain  -     Urinalysis-UC if Indicated  -     HM1(CBC and Differential)  -     Basic Metabolic Panel  -     Hepatic Profile  -     Amylase  -     Lipase  -     H. pylori Antibody, IgG  -     XR Chest PA and Lateral  -     XR Abdomen AP; Future; Expected date: 6/9/17  -     HM1 (CBC with Diff)  -     Occult Blood, Fecal  -     Culture, Urine  -     US Abdomen Complete; Future; Expected date: 6/9/17    Weight loss  -     Urinalysis-UC if Indicated  -     HM1(CBC and Differential)  -     Basic Metabolic Panel  -     Hepatic Profile  -     Amylase  -     Lipase  -     H. pylori Antibody, IgG  -     XR Chest PA and Lateral  -     XR Abdomen AP; Future; Expected date: 6/9/17  -     HM1 (CBC with Diff)  -     Occult Blood, Fecal  -     Culture, Urine  -     US Abdomen Complete; Future; Expected date: 6/9/17    Nausea & vomiting  -     Urinalysis-UC if Indicated  -     HM1(CBC and Differential)  -     Basic Metabolic Panel  -     Hepatic Profile  -     Amylase  -     Lipase  -     H. pylori Antibody, IgG  -     XR Chest PA and Lateral  -     XR Abdomen AP; Future; Expected date: 6/9/17  -     HM1 (CBC with Diff)  -     Occult Blood, Fecal  -     Culture, Urine  -     US Abdomen Complete; Future; Expected date: 6/9/17        Ann Bañuelos MD            Subjective:   Chief Complaint:  Fatigue (with headCHES ) and Dizziness (WITH NAUSEA )    21 y.o. male.     1)  "nausea x 2 years  Past month - worse  First AM - lots of activity  Also like this after he eats  Has lost about 100#  Thought this was part of puberty  No worms ub stool and no blood, sometimes black and tarry - last time was a month ago    Headaches - more recent, head feels very heavy  Is eating, eating \"all the time\"  Coffee makes his stomach worse, cheese and dairy - feels bad/slimy after eating it    Stress - no correlation; this all started with the beginning of his first job; now the job is not stressful    Mental function - memory down a bit  Sleep - sometimes tossing and turning      Current Outpatient Prescriptions   Medication Sig     albuterol (PROAIR HFA) 90 mcg/actuation inhaler Inhale 1-2 puffs every 4 (four) hours as needed for wheezing or shortness of breath (cough).     LORATADINE ORAL Take by mouth.     hydrOXYzine (ATARAX) 25 MG tablet TAKE 1 TO 2 TABLETS BY MOUTH AT BEDTIME AS NEEDED FOR ITCHING       PSFHx: appropriate sections of PMH completed/filled in   Tobacco Status:  He  reports that he has quit smoking. His smoking use included Cigarettes. He does not have any smokeless tobacco history on file.    Review of Systems:  No fever.  No rash.    Objective:    /60  Pulse (!) 56  Temp 98.5  F (36.9  C) (Oral)   Resp 14  Ht 6' (1.829 m)  Wt 200 lb (90.7 kg)  SpO2 95%  BMI 27.12 kg/m2  GENERAL: No acute distress.  HEENT: eyes clear; TMs clear; throat with mild erythema, no exudates  NECK: supple, no LA or masses  LUNGS: clear with good aeration  Chest: right side of ribs are tender with palpation  HT: reg s1s2  Abd: +BS, soft, moderately tender diffusely, no masses, no percussion tenderness, no psoas sign  Skin: no rash, extra skin noted from his weight loss  Moves around easily    Labs reviewed  CXR clear  KUB clear  Greater than 60 minutes spent with patient, with greater than 50% of time spent in counseling, consultation and care coordination regarding problems detailed above.  "

## 2021-06-14 ENCOUNTER — COMMUNICATION - HEALTHEAST (OUTPATIENT)
Dept: FAMILY MEDICINE | Facility: CLINIC | Age: 26
End: 2021-06-14

## 2021-06-14 ENCOUNTER — AMBULATORY - HEALTHEAST (OUTPATIENT)
Dept: FAMILY MEDICINE | Facility: CLINIC | Age: 26
End: 2021-06-14

## 2021-06-14 DIAGNOSIS — E55.9 VITAMIN D DEFICIENCY: ICD-10-CM

## 2021-06-14 RX ORDER — ERGOCALCIFEROL 1.25 MG/1
50000 CAPSULE ORAL WEEKLY
Qty: 4 CAPSULE | Refills: 12 | Status: SHIPPED | OUTPATIENT
Start: 2021-06-14 | End: 2021-09-12

## 2021-06-16 PROBLEM — F32.0 CURRENT MILD EPISODE OF MAJOR DEPRESSIVE DISORDER WITHOUT PRIOR EPISODE (H): Status: ACTIVE | Noted: 2019-04-02

## 2021-06-16 PROBLEM — K59.09 OTHER CONSTIPATION: Status: ACTIVE | Noted: 2019-04-02

## 2021-06-16 PROBLEM — Z63.79 OTHER STRESSFUL LIFE EVENTS AFFECTING FAMILY AND HOUSEHOLD: Status: ACTIVE | Noted: 2019-04-02

## 2021-06-16 PROBLEM — J45.990 EXERCISE-INDUCED ASTHMA: Status: ACTIVE | Noted: 2019-04-02

## 2021-06-17 ENCOUNTER — OFFICE VISIT - HEALTHEAST (OUTPATIENT)
Dept: FAMILY MEDICINE | Facility: CLINIC | Age: 26
End: 2021-06-17

## 2021-06-17 DIAGNOSIS — Z63.79 OTHER STRESSFUL LIFE EVENTS AFFECTING FAMILY AND HOUSEHOLD: ICD-10-CM

## 2021-06-17 DIAGNOSIS — F32.0 CURRENT MILD EPISODE OF MAJOR DEPRESSIVE DISORDER WITHOUT PRIOR EPISODE (H): ICD-10-CM

## 2021-06-17 DIAGNOSIS — R55 SYNCOPE, UNSPECIFIED SYNCOPE TYPE: ICD-10-CM

## 2021-06-17 DIAGNOSIS — R74.8 ELEVATED CK: ICD-10-CM

## 2021-06-17 DIAGNOSIS — Z82.49 FAMILY HISTORY OF LEFT VENTRICULAR NON-COMPACTION (LVNC): ICD-10-CM

## 2021-06-17 DIAGNOSIS — R19.5 MUCUS IN STOOL: ICD-10-CM

## 2021-06-17 ASSESSMENT — MIFFLIN-ST. JEOR: SCORE: 2294.16

## 2021-06-17 NOTE — PATIENT INSTRUCTIONS - HE
Patient Instructions by Harlan Casas PA-C at 1/23/2020 11:20 AM     Author: Harlan Casas PA-C Service: -- Author Type: Physician Assistant    Filed: 1/23/2020 11:59 AM Encounter Date: 1/23/2020 Status: Addendum    : Harlan Casas PA-C (Physician Assistant)    Related Notes: Original Note by Harlan Casas PA-C (Physician Assistant) filed at 1/23/2020 11:58 AM       You will be treated for the flu. You are contagious until your fever is gone for 24 hours. Maintain good hand hygiene, cover your cough, and limit contact to prevent spreading the illness. Symptoms typically last 1-2 weeks.    Symptom management:  - Drink plenty of fluids and allow for plenty of rest  - Use tylenol or ibuprofen every 4-6 hours for fever/discomfort    Reasons to be seen immediately for re-evaluation:  - Have trouble breathing or are short of breath  - Feel pain or pressure in your chest or belly  - Get suddenly dizzy  - Feel confused  - Have severe vomiting    If no symptom improvement in 1 week, follow-up with your primary care provider.      Patient Education     Influenza (Adult)    Influenza is also called the flu. It is a viral illness that affects the air passages of your lungs. It is different from the common cold. The flu can easily be passed from one to person to another. It may be spread through the air by coughing and sneezing. Or it can be spread by touching the sick person and then touching your own eyes, nose, or mouth.  The flu starts 1 to 3 days after you are exposed to the flu virus. It may last for 1 to 2 weeks but many people feel tired or fatigued for many weeks afterward. You usually dont need to take antibiotics unless you have a complication. This might be an ear or sinus infection or pneumonia.  Symptoms of the flu may be mild or severe. They can include extreme tiredness (wanting to stay in bed all day), chills, fevers, muscle aches, soreness with eye movement, headache, and a dry, hacking cough.  Home  care  Follow these guidelines when caring for yourself at home:    Avoid being around cigarette smoke, whether yours or other peoples.    Acetaminophen or ibuprofen will help ease your fever, muscle aches, and headache. Dont give aspirin to anyone younger than 18 who has the flu. Aspirin can harm the liver.    Nausea and loss of appetite are common with the flu. Eat light meals. Drink 6 to 8 glasses of liquids every day. Good choices are water, sport drinks, soft drinks without caffeine, juices, tea, and soup. Extra fluids will also help loosen secretions in your nose and lungs.    Over-the-counter cold medicines will not make the flu go away faster. But the medicines may help with coughing, sore throat, and congestion in your nose and sinuses. Dont use a decongestant if you have high blood pressure.    Stay home until your fever has been gone for at least 24 hours without using medicine to reduce fever.  Follow-up care  Follow up with your healthcare provider, or as advised, if you are not getting better over the next week.  If you are age 65 or older, talk with your provider about getting a pneumococcal vaccine every 5 years. You should also get this vaccine if you have chronic asthma or COPD. All adults should get a flu vaccine every fall. Ask your provider about this.  When to seek medical advice  Call your healthcare provider right away if any of these occur:    Cough with lots of colored mucus (sputum) or blood in your mucus    Chest pain, shortness of breath, wheezing, or trouble breathing    Severe headache, or face, neck, or ear pain    New rash with fever    Fever of 100.4 F (38 C) or higher, or as directed by your healthcare provider    Confusion, behavior change, or seizure    Severe weakness or dizziness    You get a new fever or cough after getting better for a few days  Date Last Reviewed: 1/1/2017 2000-2017 [employer]. [address] [facility], [place] [address]. All rights reserved.  This information is not intended as a substitute for professional medical care. Always follow your healthcare professional's instructions.

## 2021-06-17 NOTE — PROGRESS NOTES
"    Assessment & Plan     Syncope, unspecified syncope type  He is going to try to have his brother get the records from his work-up done at the [facility] for the syncope and genetic disorder.  If he can get those records he will bring them to his follow-up appointment so we can evaluate if this is something he should be tested for himself.   Ending on those records, may consider head CT and neuro consult or genetic consultation    Exercise-induced asthma  No symptoms for years.  Refilled albuterol inhaler to have as needed    Current mild episode of major depressive disorder, unspecified whether recurrent (H)  Attention deficit hyperactivity disorder (ADHD), unspecified ADHD type  Referral for counseling.  He does not feel he needs medications at this time but would like some support to help with some of his stressors and mood.  PHQ-9 is 5 which is down from 9 in 2019.  And he denies suicidal ideation  - AMB REFERRAL TO MENTAL HEALTH AND ADDICTION  - Adult (18+); Outpatient Treatment; Individual/Couples/Family/Group Therapy/Health Psychology; [facility]; Any Clinic Location; We will contact you to schedule the appointment or plea...      Irregular bowel habits  We will evaluate this further and consider IBS at his next appointment        31 minutes spent on the date of the encounter doing chart review, history and exam, documentation and further activities per the note      Return in about 3 weeks (around 5/17/2021) for Annual physical follow-up on syncope.    Tiffany Shah MD  [facility] ANGELLALAGABY Lopez ANN MARIE Steffany is 25 y.o. and presents today for the following health issues   HPI   Here for ER appt and syncope    ER 4/5/2021 notes reviewed:  Syncope, unspecified syncope type   \"significant past medical history of recurrent syncopal episodes presents today after also having recurrent episode of syncope. Here he is vitally stable. ECG is normal sinus rhythm " "without signs of ectopy or ischemia. His QTC is within normal limits and his QRS is not widened. His neuro exam is completely unremarkable. His labs are also reviewed. I do not see any acute processes at this time however the patient does have a history of syncope which had a very extensive work-up in the past\"    Initial 4/20/2016 for evaluation of a syncopal episode    FMH -   Brother has genetic mutation with similar sx - randomly faints and LOC and has to go to hospital - occurring almost 5 x per week  He was tested at [place] but never got final assessment b/c the doctor left [place].   Brother moved out of state since then.     Father also has faint feelings and head rush and foggy - similar sx but less extreme      Asthma   Act 24  And reports no asthma symptoms for years  NO MEDS      He is requesting a referral for counselor to deal with depression and possible ADHD.  He has had a fair amount of family stressors childhood traumas.    He has some questions about possible IBS because he has irregular bowel movements.  Sometimes he has a bowel movement for 2-3 times a day and other times he does have a bowel movement for 3 days.  And he has periodic abdominal pain.  He wants to address this at his next appointment      Covid vaccines  #1 on 4/7/2021 and #2 is tomorrow on 4/27/2021    He is due for his tetanus shot.      Social  He lives with his girlfriend they are engaged to get  at the end of December.  They both work at coffee shop.    Review of Systems   Please see above.  The rest of the review of systems are negative for all systems.         Objective    /70   Pulse 65   Temp 98.6  F (37  C) (Oral)   Resp 14   Ht 6' 0.44\" (1.84 m)   Wt (!) 297 lb 11.2 oz (135 kg)   SpO2 97%   BMI 39.89 kg/m    Body mass index is 39.89 kg/m .  Physical Exam    Vitals:    04/26/21 1025   BP: 106/70   Pulse: 65   Resp: 14   Temp: 98.6  F (37  C)   TempSrc: Oral   SpO2: 97%   Weight: (!) 297 lb 11.2 oz (135 kg) " "  Height: 6' 0.44\" (1.84 m)     OBJECTIVE:  Vitals listed above within normal limits  General appearance: well groomed, pleasant, well hydrated, nontoxic appearing  ENT: PERRL, throat clear  Neck: neck supple, no lymphadenopathy, no thyromegaly  Lungs: lungs clear to auscultation bilaterally, no wheezes or rhonchi  Heart: regular rate and rhythm, no murmurs, rubs or gallops  Abdomen: soft, nontender  Psych:  mood stable, appears to have good insight and judgment  Neuro: no focal deficits, CN II-XII grossly intact, alert and oriented                "

## 2021-06-19 NOTE — LETTER
Letter by Tiffany Shah MD at      Author: Tiffany Shah MD Service: -- Author Type: --    Filed:  Encounter Date: 9/23/2019 Status: Signed       September 23, 2019    Dear John Jeter:    On review of your records, we have found a gap in your health care services.  Based on your age and health history, we recommend the following service/s:  - Well child check  - Pap smear  - Colon cancer screening  - Mammogram  - Immunization/s  - Diabetes check  - Blood pressure/heart check  - Asthma check  - Depression check  - Cholesterol test  - Lab test  - Medication check  - Medicare Annual Wellness Exam    Please call us at [phone] to make an appointment for the above service/s.  If you had had the service done elsewhere, please let us know so that we can update our records.  If you have transferred care to another clinic, please let us know so that we can remove you from our list of current patients.   We believe that a strong preventive health program that includes regular visits and follow up care is an important part of your health and are committed to assisting you in being as healthy as you can be.     Sincerely,     [facility] [place]

## 2021-06-19 NOTE — LETTER
Letter by Tiffany Shah MD at      Author: Tiffany Shah MD Service: -- Author Type: --    Filed:  Encounter Date: 4/22/2019 Status: (Other)         Asthma Action Plan    Patient Name: John Jeter  Patient YOB: 1995    Doctor's Name: Tiffany Shah    Emergency Contact:              Severity Classification: Intermittent    What triggers my asthma: exercise    GREEN ZONE: Doing Well   No cough, wheeze, chest tightness or shortness of breath during the day or night  Can do your usual activities    Take these medicines before exercise if your asthma is exercise-induced:  Medicine How Much to Take When to take it   albuterol  (also known as ProAir, Ventolin and Proventil) 2 puffs 15-30 minutes prior to exercise or sports     YELLOW ZONE: Asthma is Getting Worse   Cough, wheeze, chest tightness or shortness of breath or  Waking at night due to asthma, or  Can do some, but not all, usual activities.    Keep taking green zone medications and add quick-relief medicine:  Quick Relief Medicine How Much to Take When to take it   albuterol  (also known as ProAir, Ventolin and Proventil) 2 puffs every 4 hours as needed     If you do not feel better and your symptoms do not return to the green zone after one hour of the quick relief medication, then:    Take quick relief treatment again. Call your clinician within 1 hour.    Contact your clinician if you are using quick relief medication more than 2 times per week.    RED ZONE: Medical Alert!   Very short of breath, or  Quick relief medications have not helped, or  Cannot do usual activities, or  Symptoms are same or worse after 24 hours in the Yellow Zone.    Continue green zone medicines and add:  Quick Relief Medicine Dose When to take it   albuterol  (also known as ProAir, Ventolin and Proventil) 2 puffs may repeat every 20 minutes for up to 1 hour     IF ANY OF THESE ARE HAPPENING, SEEK EMERGENCY HELP AND CALL 911!   You are struggling to  breathe and are uncomfortable or  There is simply no clear improvement and you are worried about how to get through the next 30 minutes or  Trouble walking and talking due to shortness of breath, or  Lips or fingernails are blue    Provider signature:  Electronically Signed by Tiffany Shah   Date: 04/22/19

## 2021-06-20 NOTE — PROGRESS NOTES
"Subjective:      Patient ID: John Jeter is a 22 y.o. male.    Chief Complaint:    HPI John Jeter is a 22 y.o. male who presents today complaining of right foot injury x 2 days.  Patient states that he has been walking around excessively on old shoes for the past few weeks and then 2 days ago he was getting out of a truck and he felt his right ankle and foot pop and spring up.  He did have excruciating pain at that time, but then yesterday morning he woke up with severe pain on the lateral aspect of the right foot and ankle.  He states that the ankle swelled significantly at that time also.  He has been icing  which has brought the swelling down.  He is also been taking ibuprofen.  He was having difficulty bearing weight yesterday, but is slightly better today.  He states that it is painful when he tries to point his foot and he has some pain on the plantar surface when he tries to flex his foot back.  He has a history of fractures in the right foot and toe.      Social History   Substance Use Topics     Smoking status: Former Smoker     Types: Cigarettes     Smokeless tobacco: Never Used      Comment: 1-2 cigarettes per day      Alcohol use Yes      Comment: OCC ALCOHOL       Review of Systems   Musculoskeletal: Positive for arthralgias (Right foot and ankle pain), gait problem and joint swelling.   Skin: Negative for color change and rash.   All other systems reviewed and are negative.      Objective:     /75  Pulse 92  Temp 98  F (36.7  C) (Oral)   Resp 16  Ht 6' 2\" (1.88 m)  Wt (!) 246 lb (111.6 kg)  SpO2 99%  BMI 31.58 kg/m2    Physical Exam   Constitutional: He appears well-developed and well-nourished. No distress.   HENT:   Head: Normocephalic and atraumatic.   Right Ear: External ear normal.   Left Ear: External ear normal.   Eyes: Conjunctivae are normal.   Pulmonary/Chest: Effort normal. No respiratory distress.   Musculoskeletal:        Right ankle: He exhibits normal range of motion, " no swelling, no deformity and no laceration. Tenderness. AITFL, CF ligament, posterior TFL and head of 5th metatarsal tenderness found. No lateral malleolus, no medial malleolus and no proximal fibula tenderness found. Achilles tendon exhibits no pain, no defect and normal Victor's test results.        Right foot: There is no tenderness and no swelling.   Skin: He is not diaphoretic.   Psychiatric: He has a normal mood and affect. His behavior is normal. Judgment and thought content normal.   Nursing note and vitals reviewed.    Radiology:  I have personally ordered and preliminarily reviewed the following xray, I have noted no sign of fracture.  Xr Ankle Right 3 Or More Vws    Result Date: 9/13/2018  EXAM DATE:         09/13/2018 [facility] X-RAY ANKLE RIGHT, MINIMUM THREE VIEWS 9/13/2018 1:45 PM INDICATION: Pain on lateral aspect of foot and ankle. Swelling COMPARISON: None. FINDINGS: No fracture or dislocation. Ankle mortise is intact. No soft tissue swelling around the ankle.     Xr Foot Right 3 Or More Vws    Result Date: 9/13/2018  EXAM DATE:         09/13/2018 [facility] X-RAY FOOT RIGHT, MINIMUM 3 VIEWS 9/13/2018 1:30 PM INDICATION: Pain on lateral aspect of foot and ankle. Swelling COMPARISON: None. FINDINGS: No fracture or dislocation. No significant degenerative changes. Small plantar calcaneal spur.     Clinical Decision Making:  Fracture ruled out with x-ray of foot and ankle today.  Suspect sprain of ATFL and PTFL considering swelling this happened shortly after the injury and lo.  Patient was given a Tri-Lock ankle brace over support and compression today.  He is able to bear weight without severe pain. Recommended new shoes and some decreased walking at work until symptoms resolve. I suspect promising disposition considering how quickly his swelling has improved.      Assessment:     Procedures    1. Right foot pain  XR Foot Right 3 or More VWS    XR Ankle Right  3 or More VWS    Ankle/Foot DME: Ankle Brace; Right         Patient Instructions   1) Ibuprofen 800 mg three times daily with food for as needed for pain.  2) Ice to the affected area 20 minutes three times daily.  3) Wear the Tri Julieta brace for added support and compression once pain has resolved you can discontinue it's use. There are youtube videos online that show you how to put it on.  4) Get new supportive shoes if yours are very old or broken.   5) Follow up in 10-14 days if not improving, sooner if worsening.

## 2021-06-20 NOTE — LETTER
Letter by Harlan Casas PA-C at      Author: Harlan Casas PA-C Service: -- Author Type: --    Filed:  Encounter Date: 1/23/2020 Status: (Other)         January 23, 2020     Patient: John Jeter   YOB: 1995   Date of Visit: 1/23/2020       To Whom It May Concern:    It is my medical opinion that John Jeter may return to work on 1/25/2020.    If you have any questions or concerns, please don't hesitate to call.    Sincerely,        Electronically signed by Harlan Casas PA-C

## 2021-06-22 NOTE — PROGRESS NOTES
Chief Complaint   Patient presents with     Emesis     x 4 days, runy nose, fatigue, dizziness. has not had any OTC meds. state still has nausea and dizziness     Diarrhea     x 3 day statets yesterday was the last day       HPI:  John Jeter is a 23 y.o. male who presents today complaining of nausea, vomiting, fatigue, diarrhea, and dizziness for the past 4 days.  Patient had 3 days of diarrhea and is now resolved.  He continues to have nausea and dizziness.  He has not had any over-the-counter medications for his symptoms. He reports some generalized abdominal pain that improves after an episode of diarrhea or vomiting. He denies any recent travel outside of the [place], abx use, or adventurous or undercooked foods. He denies any sore throat or fevers. He had a small amount of blood in saliva once after vomiting, but no significant amount of blood in vomit. No blood in stool.        History obtained from the patient.    Problem List:  2016-10: Post concussive syndrome  Obesity  ADHD, Predominantly Inattentive Type  Acne  Asthma  Atopic Dermatitis  Eczema  Patient Education: Mental Health      Past Medical History:   Diagnosis Date     Concussion syndrome      Dermatitis      Weight loss 2017    2016-17 lost 100# unintentionally       Social History     Tobacco Use     Smoking status: Former Smoker     Types: Cigarettes     Smokeless tobacco: Never Used     Tobacco comment: 1-2 cigarettes per day    Substance Use Topics     Alcohol use: Yes     Comment: OCC ALCOHOL       Review of Systems   Constitutional: Negative for chills and fever.   HENT: Negative for sore throat.    Gastrointestinal: Positive for abdominal pain, diarrhea, nausea and vomiting. Negative for blood in stool.   Neurological: Positive for dizziness.       Vitals:    12/20/18 0906   BP: 116/68   Patient Site: Right Arm   Patient Position: Sitting   Cuff Size: Adult Large   Pulse: 100   Resp: 15   Temp: 98.9  F (37.2  C)   TempSrc: Oral   SpO2: 98%    Weight: (!) 252 lb 8 oz (114.5 kg)       Physical Exam   Constitutional: He appears well-developed and well-nourished. No distress.   HENT:   Head: Normocephalic and atraumatic.   Right Ear: External ear normal.   Left Ear: External ear normal.   Eyes: Conjunctivae are normal. Right eye exhibits no discharge. Left eye exhibits no discharge.   Neck: Normal range of motion.   Cardiovascular: Normal rate, regular rhythm and normal heart sounds.   Pulmonary/Chest: Effort normal and breath sounds normal. No respiratory distress.   Abdominal: Soft. He exhibits no distension. There is tenderness in the right upper quadrant, epigastric area, periumbilical area and left lower quadrant. There is no guarding.   Lymphadenopathy:     He has no cervical adenopathy.   Skin: He is not diaphoretic.   Psychiatric: He has a normal mood and affect. His behavior is normal. Judgment and thought content normal.         Labs:  Recent Results (from the past 72 hour(s))   ISTAT CHEM 7 (HE CLINIC ONLY)   Result Value Ref Range    Sodium 143 136 - 145 mmol/L    Potassium 4.5 3.5 - 5.5 mmol/L    CO2 25 22 - 31 mmol/L    Chloride 106 98 - 107 mmol/L    Anion Gap, Calculation 12 5 - 18 mmol/L    Glucose 107 70 - 125 mg/dL    BUN 17 8 - 22 mg/dL    Creatinine 0.90 0.80 - 1.50 mg/dL   Lipase   Result Value Ref Range    Lipase 15 0 - 52 U/L   HM1 (CBC with Diff)   Result Value Ref Range    WBC 6.3 4.0 - 11.0 thou/uL    RBC 4.74 4.40 - 6.20 mill/uL    Hemoglobin 15.9 14.0 - 18.0 g/dL    Hematocrit 45.2 40.0 - 54.0 %    MCV 95 80 - 100 fL    MCH 33.6 27.0 - 34.0 pg    MCHC 35.2 32.0 - 36.0 g/dL    RDW 10.8 (L) 11.0 - 14.5 %    Platelets 228 140 - 440 thou/uL    MPV 7.9 7.0 - 10.0 fL    Neutrophils % 63 50 - 70 %    Lymphocytes % 27 20 - 40 %    Monocytes % 6 2 - 10 %    Eosinophils % 3 0 - 6 %    Basophils % 1 0 - 2 %    Neutrophils Absolute 3.9 2.0 - 7.7 thou/uL    Lymphocytes Absolute 1.7 0.8 - 4.4 thou/uL    Monocytes Absolute 0.4 0.0 - 0.9  thou/uL    Eosinophils Absolute 0.2 0.0 - 0.4 thou/uL    Basophils Absolute 0.0 0.0 - 0.2 thou/uL     Clinical Decision Making:  I-STAT not indicative of severe dehydration today.  Lipase negative rule out of pancreatitis.  Low suspicion for appendicitis based on location of pain.  CBC was normal today.  Suspect viral gastroenteritis.  Description of throat bleeding does not sound consistent with Vidya-Juárez tears.  Patient was treated with Zofran for supportive cares.    At the end of the encounter, I discussed results, diagnosis, medications. Discussed red flags for immediate return to clinic/ER, as well as indications for follow up if no improvement. Patient understood and agreed to plan. Patient was stable for discharge.    1. Vomiting and diarrhea  ISTAT CHEM 7 ([facility] CLINIC ONLY)    HM1(CBC and Differential)    Lipase    HM1 (CBC with Diff)    ondansetron (ZOFRAN ODT) 4 MG disintegrating tablet         Patient Instructions   1. Your blood work was relatively normal today.  We will only call you regarding her lipase results if it is abnormal.  If you do not hear from us assume that it was normal.  2.  Begin taking Zofran as needed for nausea control.  Take small but frequent sips of clear fluids such as Powerade, Pedialyte, diluted juice, or water.  3.  Follow-up if your vomiting does not improve over the course the next 2 days.  5.  Low suspicion for any bacterial or parasitic infection considering your risk for history.  I most highly suspect this to be a viral stomach bug, which will likely resolve on its own.

## 2021-06-24 NOTE — TELEPHONE ENCOUNTER
Call patient to see if he is getting inhalers from another physician.   Is he still a patient here? Haven't seen him recently.  VM not set up yet  Will try at another time

## 2021-06-24 NOTE — TELEPHONE ENCOUNTER
Patient hasn't filled his inhalers or any asthma med for 4 years. Do you want to see him or take him off the adult asthma list?

## 2021-06-26 NOTE — PROGRESS NOTES
"    Assessment & Plan     Family history of left ventricular non-compaction (LVNC)  Syncope, unspecified syncope type  Elevated CK  Will check echo to r/o LVNC as cause of his syncope    - Echo Complete    Other stressful life events affecting family and household  Current mild episode of major depressive disorder without prior episode (H)  Awaiting mental health appointments scheduled for August    Mucus in stool  Awaiting GI consult scheduled for 7/1/21          BMI:   Estimated body mass index is 37.49 kg/m  as calculated from the following:    Height as of this encounter: 6' 0.44\" (1.84 m).    Weight as of this encounter: 279 lb 12.8 oz (126.9 kg).     Return in about 3 months (around 9/17/2021) for Recheck, for PTSD/MDD.    Tiffany Shah MD  [facility]   John Jeter is 25 y.o. and presents today for the following health issues   HPI   He is here to follow-up on labs and consults.    Irregular bowels and mucus in stool  The mucus and current jelly stool and irregular bowels concerning for inflammatory bowel disease like ulcerative colitis or Crohn's disease discussed with the patient he reviewed a colonoscopy to determine.  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  We reviewed the chart and he did have a normal echo in 2011.  But he still having these recurring episodes of feeling weak and lightheaded and has passed out periodically despite work-ups years ago with neuro and cardiology.  " We'll go ahead and order an echo to see if he has any evidence of this left ventricular noncompaction syndrome.    He has some depression and anxiety.  Afshan was ordered for mental health.  He has an assessment scheduled for August 3 and therapy to start virtually on August 10.  He says his employer  is offering access to [employer] which looks like an employment based coaching and mental health support program.  He sounded interested in this.  We did discuss that the coaching may not have the same depth as a mental health counselor but could help provide with building coping strategies.  It seems that he could benefit from doing both especially since the employment based one is free.    Vitamin D deficiency  His level was quite low.  He was not aware that I had sent the prescription for to start weekly ergocalciferol.  He is willing to start this    We reviewed all of his labs that he had done last visit.  Recent Results (from the past 1008 hour(s))   CK Total    Collection Time: 05/20/21  3:30 PM   Result Value Ref Range    CK, Total 250 (H) 30 - 190 U/L   Vitamin D, Total (25-Hydroxy)    Collection Time: 05/20/21  3:30 PM   Result Value Ref Range    Vitamin D, Total (25-Hydroxy) 15.1 (L) 30.0 - 80.0 ng/mL   Thyroid Cascade    Collection Time: 05/20/21  3:30 PM   Result Value Ref Range    TSH 0.56 0.30 - 5.00 uIU/mL   Glycosylated Hemoglobin A1c    Collection Time: 05/20/21  3:30 PM   Result Value Ref Range    Hemoglobin A1c 5.3 <=5.6 %   Lipid [place] FASTING    Collection Time: 05/20/21  3:30 PM   Result Value Ref Range    Cholesterol 143 <=199 mg/dL    Triglycerides 59 <=149 mg/dL    HDL Cholesterol 45 >=40 mg/dL    LDL Calculated 86 <=129 mg/dL    Patient Fasting > 8hrs? No    Comprehensive Metabolic Panel    Collection Time: 05/20/21  3:30 PM   Result Value Ref Range    Sodium 140 136 - 145 mmol/L    Potassium 4.4 3.5 - 5.0 mmol/L    Chloride 109 (H) 98 - 107 mmol/L    CO2 22 22 - 31 mmol/L    Anion Gap, Calculation  "9 5 - 18 mmol/L    Glucose 90 70 - 125 mg/dL    BUN 11 8 - 22 mg/dL    Creatinine 0.79 0.70 - 1.30 mg/dL    GFR MDRD Af Amer >60 >60 mL/min/1.73m2    GFR MDRD Non Af Amer >60 >60 mL/min/1.73m2    Bilirubin, Total 0.9 0.0 - 1.0 mg/dL    Calcium 8.7 8.5 - 10.5 mg/dL    Protein, Total 6.2 6.0 - 8.0 g/dL    Albumin 3.5 3.5 - 5.0 g/dL    Alkaline Phosphatase 86 45 - 120 U/L    AST 18 0 - 40 U/L    ALT <45 0 - 45 U/L   Lipase    Collection Time: 05/20/21  3:30 PM   Result Value Ref Range    Lipase 20 0 - 52 U/L   HIV Antigen/Antibody Screening [place]    Collection Time: 05/20/21  3:30 PM   Result Value Ref Range    HIV Antigen / Antibody Negative Negative   Hepatitis C Antibody (Anti-HCV)    Collection Time: 05/20/21  3:30 PM   Result Value Ref Range    Hepatitis C Ab Negative Negative   Hepatitis B Surface Antigen (HBsAG)    Collection Time: 05/20/21  3:30 PM   Result Value Ref Range    Hepatitis B Surface Ag Negative Negative   Hepatitis B Core Antibody (Anti-HBc)    Collection Time: 05/20/21  3:30 PM   Result Value Ref Range    Hep B Core Total Ab Negative Negative   Hepatitis B Surface Antibody (Anti-HBs)    Collection Time: 05/20/21  3:30 PM   Result Value Ref Range    Hep B Surface Antibody Positive (!) Negative         Review of Systems     Please see above.  The rest of the review of systems are negative for all systems.     Current Outpatient Medications   Medication Sig     albuterol (PROAIR HFA) 90 mcg/actuation inhaler Inhale 1-2 puffs every 4 (four) hours as needed for wheezing or shortness of breath (cough and before exercise).     cholecalciferol, vitamin D3, 400 unit Tab Take 500 Units by mouth daily.     ergocalciferol (ERGOCALCIFEROL) 1,250 mcg (50,000 unit) capsule Take 1 capsule (50,000 Units total) by mouth once a week.           Objective    /60   Pulse 86   Temp 98.2  F (36.8  C) (Oral)   Resp 14   Ht 6' 0.44\" (1.84 m)   Wt (!) 279 lb 12.8 oz (126.9 kg)   SpO2 95%   BMI 37.49 kg/m  " "  Body mass index is 37.49 kg/m .  Physical Exam    Vitals:    06/17/21 1151   BP: 104/60   Pulse: 86   Resp: 14   Temp: 98.2  F (36.8  C)   TempSrc: Oral   SpO2: 95%   Weight: (!) 279 lb 12.8 oz (126.9 kg)   Height: 6' 0.44\" (1.84 m)     PHYSICAL EXAM   General Appearance: Awake and alert, in no acute distress  HEENT: neck is supple  CV: regular rate  Resp: No respiratory distress. Breathing comfortably  Musculoskeletal: moving limbs comfortably with not deficits or deformities  Skin: no rashes noted        "

## 2021-06-28 NOTE — PROGRESS NOTES
Progress Notes by Harlan Casas PA-C at 1/23/2020 11:20 AM     Author: Harlan Casas PA-C Service: -- Author Type: Physician Assistant    Filed: 1/25/2020 10:20 AM Encounter Date: 1/23/2020 Status: Signed    : Harlan Casas PA-C (Physician Assistant)       Subjective:      Patient ID: John Jeter is a 24 y.o. male.    Chief Complaint:    HPI     John Jeter is a 24 y.o. male who presents today complaining of 4-day history of flu like symptoms to include cough that is productive of no phlegm.  He stated that he had a 2-day prodrome and then 2 days ago he had acute onset of headache sore throat myalgias arthralgias vomiting and diarrhea and loss of appetite.  He has had associated fatigue but no headache vomiting diarrhea that has persisted and no skin rash abdominal pain or urinary symptoms.  He had initially 4 days ago 2 days history of emesis and diarrhea.  That has abated.  She has now had fatigue and cough is the lingering symptom for the last 4 days.  He is continuing to take fluids well.  He is not having problematic fever.  No abdominal pain urinary symptoms skin rash to report.  He has not had antipyretic today.    Patient is requesting a note for work for today's office appointment in his illness.      Past Medical History:   Diagnosis Date   ? Concussion syndrome    ? Dermatitis    ? Eczema     Created by Conversion    ? Post concussive syndrome 10/6/2016    Three concussions due to football related injuries with only one time there was LOC of less than a minute. This occurred in middle school.    ? Weight loss 2017 2016-17 lost 100# unintentionally       No past surgical history on file.    No family history on file.    Social History     Tobacco Use   ? Smoking status: Current Every Day Smoker     Types: Cigarettes   ? Smokeless tobacco: Never Used   ? Tobacco comment: 1-2 cigarettes per day    Substance Use Topics   ? Alcohol use: No     Frequency: Never     Comment: OCC ALCOHOL   ? Drug  "use: Yes     Types: Marijuana       Review of Systems  As above in HPI, otherwise balance of Review of Systems are negative.    Objective:     /78 (Patient Site: Right Arm, Patient Position: Sitting, Cuff Size: Adult Large)   Pulse 70   Temp 98.6  F (37  C) (Oral)   Resp 16   Ht 6' 0.44\" (1.84 m)   Wt (!) 265 lb 8 oz (120.4 kg)   SpO2 98%   BMI 35.57 kg/m      Physical Exam  General: Patient is resting comfortably no acute distress is afebrile  HEENT: Head is normocephalic atraumatic   eyes are PERRL EOMI sclera anicteric   TMs are clear bilaterally  Throat is clear no exudate  No cervical lymphadenopathy present  LUNGS: Clear to auscultation bilaterally  HEART: Regular rate and rhythm  Skin: Without rash non-diaphoretic      Assessment:     Procedures    The encounter diagnosis was Influenza-like illness.    Plan:     1. Influenza-like illness  oseltamivir (TAMIFLU) 75 MG capsule       Had a conversation with the patient stating we will treat him for influenza-like illness.  He is started on the Tamiflu since his symptoms have worsened over the last 2 days.  I had a discussion that what sounded like a prodrome for the first 2 days with acute onset of all the symptoms 2 days later.  Risks and benefits of the Tamiflu medication were gone over indication for return was also gone over questions were answered to patient satisfaction before discharge.    Patient Instructions   You will be treated for the flu. You are contagious until your fever is gone for 24 hours. Maintain good hand hygiene, cover your cough, and limit contact to prevent spreading the illness. Symptoms typically last 1-2 weeks.    Symptom management:  - Drink plenty of fluids and allow for plenty of rest  - Use tylenol or ibuprofen every 4-6 hours for fever/discomfort    Reasons to be seen immediately for re-evaluation:  - Have trouble breathing or are short of breath  - Feel pain or pressure in your chest or belly  - Get suddenly dizzy  - " Feel confused  - Have severe vomiting    If no symptom improvement in 1 week, follow-up with your primary care provider.      Patient Education     Influenza (Adult)    Influenza is also called the flu. It is a viral illness that affects the air passages of your lungs. It is different from the common cold. The flu can easily be passed from one to person to another. It may be spread through the air by coughing and sneezing. Or it can be spread by touching the sick person and then touching your own eyes, nose, or mouth.  The flu starts 1 to 3 days after you are exposed to the flu virus. It may last for 1 to 2 weeks but many people feel tired or fatigued for many weeks afterward. You usually dont need to take antibiotics unless you have a complication. This might be an ear or sinus infection or pneumonia.  Symptoms of the flu may be mild or severe. They can include extreme tiredness (wanting to stay in bed all day), chills, fevers, muscle aches, soreness with eye movement, headache, and a dry, hacking cough.  Home care  Follow these guidelines when caring for yourself at home:    Avoid being around cigarette smoke, whether yours or other peoples.    Acetaminophen or ibuprofen will help ease your fever, muscle aches, and headache. Dont give aspirin to anyone younger than 18 who has the flu. Aspirin can harm the liver.    Nausea and loss of appetite are common with the flu. Eat light meals. Drink 6 to 8 glasses of liquids every day. Good choices are water, sport drinks, soft drinks without caffeine, juices, tea, and soup. Extra fluids will also help loosen secretions in your nose and lungs.    Over-the-counter cold medicines will not make the flu go away faster. But the medicines may help with coughing, sore throat, and congestion in your nose and sinuses. Dont use a decongestant if you have high blood pressure.    Stay home until your fever has been gone for at least 24 hours without using medicine to reduce  fever.  Follow-up care  Follow up with your healthcare provider, or as advised, if you are not getting better over the next week.  If you are age 65 or older, talk with your provider about getting a pneumococcal vaccine every 5 years. You should also get this vaccine if you have chronic asthma or COPD. All adults should get a flu vaccine every fall. Ask your provider about this.  When to seek medical advice  Call your healthcare provider right away if any of these occur:    Cough with lots of colored mucus (sputum) or blood in your mucus    Chest pain, shortness of breath, wheezing, or trouble breathing    Severe headache, or face, neck, or ear pain    New rash with fever    Fever of 100.4 F (38 C) or higher, or as directed by your healthcare provider    Confusion, behavior change, or seizure    Severe weakness or dizziness    You get a new fever or cough after getting better for a few days  Date Last Reviewed: 1/1/2017 2000-2017 [employer]. [address] [place] [address]. All rights reserved. This information is not intended as a substitute for professional medical care. Always follow your healthcare professional's instructions.

## 2021-06-30 NOTE — PROGRESS NOTES
Progress Notes by Tiffany Shah MD at 5/20/2021  2:40 PM     Author: Tiffany Shah MD Service: -- Author Type: Physician    Filed: 5/21/2021  1:04 AM Encounter Date: 5/20/2021 Status: Signed    : Tiffany Shah MD (Physician)       MALE PREVENTATIVE EXAM    Assessment and Plan:   1. Annual physical exam  Vaccines up-to-date including his Covid vaccine.  He had the Pfizer vaccine #1 on April 7 and #2 on April 27.  He will get his tetanus shot today  - Glycosylated Hemoglobin A1c  - Lipid [place] FASTING    2. Vision changes  - Ambulatory referral to Ophthalmology - [facility]    3. Need for Td vaccine  - Td, Preservative Free (green label)    4. Syncope, unspecified syncope type  Reviewed his brothers medical record for the left ventricular noncompaction cardiomyopathy.  He will see if he can get the work-up for himself done at the [facility] as part of his brothers evaluation and if not then I can refer him to cardiology and ordered cardiac echo.  We will check a CK level today since her dry blood    5. Current mild episode of major depressive disorder, unspecified whether recurrent (H)  Awaiting psychological evaluation scheduled for early August  - Thyroid Cascade    6. Family history of diabetes mellitus  We will check for diabetes given his family history of mother and father both having diabetes and his obesity  - Glycosylated Hemoglobin A1c    7. Family history of left ventricular non-compaction (LVNC)  We will check CK level and await to see if he can get an echo done at the [facility]  - CK Total    8. Vitamin D deficiency  - Vitamin D, Total (25-Hydroxy)    9. Screening for diabetes mellitus  - Glycosylated Hemoglobin A1c    10. BMI 37.0-37.9, adult  - Lipid [place] FASTING    11. RUQ abdominal pain  - Comprehensive Metabolic Panel  - Lipase  - US Abdomen Complete; Future    12. Irregular bowel habits   Mucus in stool  The mucus and current jelly stool and irregular  bowels concerning for inflammatory bowel disease like ulcerative colitis or Crohn's disease discussed with the patient he reviewed a colonoscopy to determine.  We will start with a GI consultation  - Ambulatory referral to Gastroenterology - [facility]    13. Need for hepatitis B screening test  He has an aunt with hepatitis B  - Hepatitis B Surface Antigen (HBsAG)  - Hepatitis B Core Antibody (Anti-HBc)  - Hepatitis B Surface Antibody (Anti-HBs)    14. Need for hepatitis C screening test  - Hepatitis C Antibody (Anti-HCV)    15. Encounter for screening for HIV  - HIV Antigen/Antibody Screening [place]            Next follow up:  Follow-up in 4 weeks to review labs and consultations    Immunization Review  Adult Imm Review: Due today, orders placed      I discussed the following with the patient:   Adult Healthy Living: Importance of regular exercise  Healthy nutrition        Subjective:   Chief Complaint: John Jeter is an 25 y.o. male here for a preventative health visit.    Patient has been advised of split billing requirements and indicates understanding: Yes  HPI:   Annual exam  Vaccines - due for Td  covid vaccine - pfizer x 2 at [facility]  (#1 4/7/21 and #2 on 4/27/21    Irregular bowel habits  BM 4 x per to every 3 days  No blood, black  Mucus and currant jelly   RUQ pressure and bloaty, when twists feels like ribs clicking     Unexplained syncope  Apparently his brother has this even more severely than he does.  His brother is getting a work-up at the [facility].  He was able to get the records from his brother so we could see if he had similar health issue.  The [facility] diagnosed him with arrhythmia and left ventricular noncompaction syndrome also known as LV and see or spongy myocardium.  His brother was noted to have increased LV trabeculation which is consistent with noncompaction cardiomyopathy.  Who underwent a genetic evaluation and there was no specific genetic mutation found for his brother.  " Apparently his dad has similar unexplained syncopal episodes and was getting some testing done at the [facility] as part of the brothers genetic work-up.  John thinks that he may be able to get a work-up and echo done at the [facility] also as part of his brothers genetic workup.  LVNC initial work-up includes EKG, echo, CK level      ADHD/MDD -   Psych referral ordered 4/26/21  appt scheduled for 8/3/21 (awaiting insurance)    Low Vit D 16.6 on 2015    BMI 37.7     Healthy Habits  Are you taking a daily aspirin? No  Do you typically exercising at least 40 min, 3-4 times per week?  Yes  Do you usually eat at least 4 servings of fruit and vegetables a day, include whole grains and fiber and avoid regularly eating high fat foods? Yes  Have you had an eye exam in the past two years? NO  Do you see a dentist twice per year? NO  Do you have any concerns regarding sleep? No    Safety Screen  If you own firearms, are they secured in a locked gun cabinet or with trigger locks? The patient does not own any firearms  Do you feel you are safe where you are living?: Yes (5/20/2021  2:27 PM)      Review of Systems:  Please see above.  The rest of the review of systems are negative for all systems.     Cancer Screening     Patient has no health maintenance due at this time          Patient Care Team:  Tiffany Shah MD as PCP - General  Tiffany Shah MD as Assigned PCP        History     Reviewed By Date/Time Sections Reviewed    Harriett Lamb MA 5/20/2021  2:28 PM Tobacco            Objective:   Vital Signs:   Visit Vitals  /74   Pulse 68   Temp 98.2  F (36.8  C) (Oral)   Resp 18   Ht 6' 0.44\" (1.84 m)   Wt (!) 281 lb 2 oz (127.5 kg)   BMI 37.67 kg/m           PHYSICAL EXAM  OBJECTIVE:  Vitals listed above within normal limits  General appearance: well groomed, pleasant, well hydrated, nontoxic appearing  ENT: PERRL, throat clear  Neck: neck supple, no lymphadenopathy, no thyromegaly  Lungs: lungs clear " to auscultation bilaterally, no wheezes or rhonchi  Heart: regular rate and rhythm, no murmurs, rubs or gallops  Abdomen: soft, diffuse abdominal tenderness worse in the left lower quadrant and right upper quadrant  Neuro: no focal deficits, CN II-XII grossly intact, alert and oriented  Psych:  mood stable, appears to have good insight and judgment               Medication List          Accurate as of May 20, 2021  2:57 PM. If you have any questions, ask your nurse or doctor.            CONTINUE taking these medications    albuterol 90 mcg/actuation inhaler  Also known as: ProAir HFA  INSTRUCTIONS: Inhale 1-2 puffs every 4 (four) hours as needed for wheezing or shortness of breath (cough and before exercise).        cholecalciferol (vitamin D3) 400 unit Tab  INSTRUCTIONS: Take 500 Units by mouth daily.               Additional Screenings Completed Today:

## 2021-07-03 NOTE — ADDENDUM NOTE
Addendum Note by Carolyn Ortiz MLT at 6/16/2017  2:33 PM     Author: Carolyn Ortiz MLT Service: -- Author Type:     Filed: 6/16/2017  2:33 PM Encounter Date: 6/9/2017 Status: Signed    : Carolyn Ortiz MLT ()    Addended by: CAROLYN ORTIZ on: 6/16/2017 02:33 PM        Modules accepted: Orders

## 2021-07-05 PROBLEM — Z82.49: Status: ACTIVE | Noted: 2021-06-17

## 2021-07-05 PROBLEM — R55 SYNCOPE, UNSPECIFIED SYNCOPE TYPE: Status: ACTIVE | Noted: 2021-06-17

## 2021-07-05 PROBLEM — R19.5 MUCUS IN STOOL: Status: ACTIVE | Noted: 2021-06-17

## 2021-07-05 PROBLEM — R74.8 ELEVATED CK: Status: ACTIVE | Noted: 2021-06-17

## 2021-08-03 ENCOUNTER — DOCUMENTATION ONLY (OUTPATIENT)
Dept: BEHAVIORAL HEALTH | Facility: CLINIC | Age: 26
End: 2021-08-03

## 2021-08-03 NOTE — PROGRESS NOTES
Patient was scheduled for an 8:00 initial assessment.  Unable to reach.  His VM was not set up.    Joy Sifuentes [facility]  8/3/21

## 2021-10-16 ENCOUNTER — HEALTH MAINTENANCE LETTER (OUTPATIENT)
Age: 26
End: 2021-10-16

## 2021-11-16 ASSESSMENT — PATIENT HEALTH QUESTIONNAIRE - PHQ9
SUM OF ALL RESPONSES TO PHQ QUESTIONS 1-9: 8
SUM OF ALL RESPONSES TO PHQ QUESTIONS 1-9: 8
10. IF YOU CHECKED OFF ANY PROBLEMS, HOW DIFFICULT HAVE THESE PROBLEMS MADE IT FOR YOU TO DO YOUR WORK, TAKE CARE OF THINGS AT HOME, OR GET ALONG WITH OTHER PEOPLE: SOMEWHAT DIFFICULT

## 2021-11-16 ASSESSMENT — ANXIETY QUESTIONNAIRES
7. FEELING AFRAID AS IF SOMETHING AWFUL MIGHT HAPPEN: NOT AT ALL
4. TROUBLE RELAXING: SEVERAL DAYS
GAD7 TOTAL SCORE: 7
1. FEELING NERVOUS, ANXIOUS, OR ON EDGE: MORE THAN HALF THE DAYS
6. BECOMING EASILY ANNOYED OR IRRITABLE: SEVERAL DAYS
7. FEELING AFRAID AS IF SOMETHING AWFUL MIGHT HAPPEN: NOT AT ALL
3. WORRYING TOO MUCH ABOUT DIFFERENT THINGS: SEVERAL DAYS
2. NOT BEING ABLE TO STOP OR CONTROL WORRYING: NOT AT ALL
5. BEING SO RESTLESS THAT IT IS HARD TO SIT STILL: MORE THAN HALF THE DAYS
GAD7 TOTAL SCORE: 7
GAD7 TOTAL SCORE: 7
8. IF YOU CHECKED OFF ANY PROBLEMS, HOW DIFFICULT HAVE THESE MADE IT FOR YOU TO DO YOUR WORK, TAKE CARE OF THINGS AT HOME, OR GET ALONG WITH OTHER PEOPLE?: SOMEWHAT DIFFICULT

## 2021-11-17 ENCOUNTER — HOSPITAL ENCOUNTER (OUTPATIENT)
Dept: BEHAVIORAL HEALTH | Facility: CLINIC | Age: 26
Discharge: HOME | End: 2021-11-17
Attending: FAMILY MEDICINE | Admitting: FAMILY MEDICINE
Payer: COMMERCIAL

## 2021-11-17 ENCOUNTER — TELEPHONE (OUTPATIENT)
Dept: BEHAVIORAL HEALTH | Facility: CLINIC | Age: 26
End: 2021-11-17
Payer: COMMERCIAL

## 2021-11-17 ENCOUNTER — TELEPHONE (OUTPATIENT)
Dept: BEHAVIORAL HEALTH | Facility: CLINIC | Age: 26
End: 2021-11-17

## 2021-11-17 DIAGNOSIS — F98.8 ATTENTION DEFICIT DISORDER, UNSPECIFIED HYPERACTIVITY PRESENCE: ICD-10-CM

## 2021-11-17 DIAGNOSIS — F32.0 CURRENT MILD EPISODE OF MAJOR DEPRESSIVE DISORDER WITHOUT PRIOR EPISODE (H): Primary | ICD-10-CM

## 2021-11-17 PROCEDURE — 90791 PSYCH DIAGNOSTIC EVALUATION: CPT | Mod: 95 | Performed by: COUNSELOR

## 2021-11-17 ASSESSMENT — COLUMBIA-SUICIDE SEVERITY RATING SCALE - C-SSRS
2. HAVE YOU ACTUALLY HAD ANY THOUGHTS OF KILLING YOURSELF LIFETIME?: YES
4. HAVE YOU HAD THESE THOUGHTS AND HAD SOME INTENTION OF ACTING ON THEM?: NO
1. IN THE PAST MONTH, HAVE YOU WISHED YOU WERE DEAD OR WISHED YOU COULD GO TO SLEEP AND NOT WAKE UP?: YES
6. HAVE YOU EVER DONE ANYTHING, STARTED TO DO ANYTHING, OR PREPARED TO DO ANYTHING TO END YOUR LIFE?: NO
TOTAL  NUMBER OF ABORTED OR SELF INTERRUPTED ATTEMPTS PAST LIFETIME: NO
6. HAVE YOU EVER DONE ANYTHING, STARTED TO DO ANYTHING, OR PREPARED TO DO ANYTHING TO END YOUR LIFE?: NO
ATTEMPT PAST THREE MONTHS: NO
5. HAVE YOU STARTED TO WORK OUT OR WORKED OUT THE DETAILS OF HOW TO KILL YOURSELF? DO YOU INTEND TO CARRY OUT THIS PLAN?: NO
ATTEMPT LIFETIME: NO
TOTAL  NUMBER OF ABORTED OR SELF INTERRUPTED ATTEMPTS PAST 3 MONTHS: NO
TOTAL  NUMBER OF INTERRUPTED ATTEMPTS PAST 3 MONTHS: NO
1. IN THE PAST MONTH, HAVE YOU WISHED YOU WERE DEAD OR WISHED YOU COULD GO TO SLEEP AND NOT WAKE UP?: NO
4. HAVE YOU HAD THESE THOUGHTS AND HAD SOME INTENTION OF ACTING ON THEM?: NO
5. HAVE YOU STARTED TO WORK OUT OR WORKED OUT THE DETAILS OF HOW TO KILL YOURSELF? DO YOU INTEND TO CARRY OUT THIS PLAN?: NO
3. HAVE YOU BEEN THINKING ABOUT HOW YOU MIGHT KILL YOURSELF?: NO
TOTAL  NUMBER OF INTERRUPTED ATTEMPTS LIFETIME: NO
2. HAVE YOU ACTUALLY HAD ANY THOUGHTS OF KILLING YOURSELF?: NO

## 2021-11-17 ASSESSMENT — PATIENT HEALTH QUESTIONNAIRE - PHQ9: SUM OF ALL RESPONSES TO PHQ QUESTIONS 1-9: 8

## 2021-11-17 ASSESSMENT — ANXIETY QUESTIONNAIRES: GAD7 TOTAL SCORE: 7

## 2021-11-17 NOTE — TELEPHONE ENCOUNTER
First attempt at reaching patient. Unable to leave message due to mailbox not being set up.        ----- Message from BUTCH Henao sent at 11/17/2021 10:57 AM CST -----  Regarding: Referral  Transition Clinic Referral     Type of Referral:    __ _x__Therapy    _____Therapy & Medication (Therapy will be scheduled first)    Referring Provider Name: BUTCH Copeland, BAKARI    Referring Provider Contact Phone Number: [phone]    Reason for Transition Clinic Referral: Bridging therapy care    Next Level of Care Patient Will Be Transitioned To: [facility], 9035 order placed for him to be called to schedule with [facility] therapy.     Start Date for Next Level of Care (Required): TBD     Type of Clinical Assessment Completed (Crisis, DA, PETER, etc.): DA    Date Clinical Assessment was Completed: 11/17/21    Diagnosis:  Principal DSM5 Diagnoses  (Sustained by DSM5 Criteria Listed Above):300.00 (F41.9) Unspecified Anxiety Disorder, Unspecified Trauma-and Stressor-Related Disorder 309.9 (F43.9) Rule Out Generalized Anxiety Disorder  Other Diagnoses that is relevant to services: Per history,  Attention-Deficit/Hyperactivity Disorder  314.01 (F90.9) Unspecified Attention -Deficit / Hyperactivity Disorder  296.21 (F32.0) Major Depressive Disorder, Single Episode, Mild _.    What Would Be Helpful from the Transition Clinic: None identified when discussed but reported he would like to work through trauma and self identity.     Needs: NO    Does Patient Have Access to Technology: yes    Patient E-mail Address: [email or web address]    Current Patient Phone Number: [phone];     Clinician Gender Preference (if applicable): NO    BUTCH Copeland, BAKARI    Thanks!

## 2021-11-17 NOTE — PATIENT INSTRUCTIONS
"John,  It was a pleasure meeting with you today. Below is the scheduled ADHD testing appointment details. I also put in an order for [facility] ([facility]) behavioral intake team to call you to discuss appointment options for ADHD testing with [facility] and for them to get you scheduled with [facility] for individual therapy.They have 24-48 hours roughly to call you to set up these appointments. If you would like to call them directly to schedule they should be able to reference my order and their number is [phone]. I also put in a referral for [facility] to call you to set up an individual therapy appointment with them in the meantime until your scheduled appointment with [facility] for individual therapy (who will be your long term therapist). They should call you in 1-2 days. If you need to make changes with your scheduled ADHD testing appointment below you can reach out to the clinic directly or let me know. I also put resources below. If you have any questions or want more resources feel free to reach out to me.    ADHD TESTING APPOINTMENT SCHEDULED  Date: Thursday, 4/14/2022  Time: 9:00 am   Provider: Chris Correia PsyD, LP  Location: [facility], [address] [place] [address]  Phone: [phone]  Type: Testing   Patient Instructions:The address to clinic is [address] [place] [address]. There is no suite number. When you enter into the main door, take an immediate left and open the door marked [facility] I will greet you upon arrival. Thank you!    RESOURCES  Other community options for ADHD Testing  [facility]??   Telephone:[phone]???   Website:[email or web address]??      [facility]???   Telephone:[phone]??   Website:[email or web address]??     [facility]??   Telephone:[phone]?   Website:[email or web address]??     [facility]??   [phone]?   Website:[email or web address]/??     [facility]?   Telephone: [phone]?   Website:[email or web address]/adult-[email or web address]-[email or web address]??[email or web address]/[email or web address]-[email or web address]/??     [facility] ([facility])?[place], [place]??[phone]????????[email or web address]??     [facility]-Dr. Giorgio Braxton    Phone: [phone] ext [phone].  Email- [email or web address]     Free Counseling  [facility] (free short term therapy)  Website:[email or web address]    Maria Guadalupe Quintero LPCC, LADC  Licensed Psychotherapist   [facility]  Mental Health and Addiction Services [email or web address]  [email or web address]  Office: [phone]  Fax: [phone]  Gender pronouns: she/her/hers
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
## 2021-11-17 NOTE — TELEPHONE ENCOUNTER
Needs referral for pain management Dr. Arlen Leggett. Spine intervention, back pain. appt 9/24/2019 10 am  Fax [phone]    Michelle notified. Pending. Tried reaching out to patient to check them in for their virtual MH eval today, 11/17/2021 at 0800. Called, but no answer and unable to leave a voice message as their voicemail box is not set up yet.

## 2021-11-18 ENCOUNTER — TELEPHONE (OUTPATIENT)
Dept: BEHAVIORAL HEALTH | Facility: CLINIC | Age: 26
End: 2021-11-18

## 2021-11-18 NOTE — TELEPHONE ENCOUNTER
This coordinator spoke with pt and scheduled appointment with TC on 11/22/2021 at 5:30pm.       ----- Message from BUTCH Henao sent at 11/17/2021 10:57 AM CST -----  Regarding: Referral  Transition Clinic Referral     Type of Referral:    __ _x__Therapy    _____Therapy & Medication (Therapy will be scheduled first)    Referring Provider Name: BUTCH Copeland[facility]    Referring Provider Contact Phone Number: [phone]    Reason for Transition Clinic Referral: Bridging therapy care    Next Level of Care Patient Will Be Transitioned To: [facility], 9035 order placed for him to be called to schedule with [facility] therapy.     Start Date for Next Level of Care (Required): TBD     Type of Clinical Assessment Completed (Crisis, DA, PETER, etc.): DA    Date Clinical Assessment was Completed: 11/17/21    Diagnosis:  Principal DSM5 Diagnoses  (Sustained by DSM5 Criteria Listed Above):300.00 (F41.9) Unspecified Anxiety Disorder, Unspecified Trauma-and Stressor-Related Disorder 309.9 (F43.9) Rule Out Generalized Anxiety Disorder  Other Diagnoses that is relevant to services: Per history,  Attention-Deficit/Hyperactivity Disorder  314.01 (F90.9) Unspecified Attention -Deficit / Hyperactivity Disorder  296.21 (F32.0) Major Depressive Disorder, Single Episode, Mild _.    What Would Be Helpful from the Transition Clinic: None identified when discussed but reported he would like to work through trauma and self identity.     Needs: NO    Does Patient Have Access to Technology: yes    Patient E-mail Address: [email or web address]    Current Patient Phone Number: [phone];     Clinician Gender Preference (if applicable): NO    BUTCH Copeland, [facility]    Thanks!

## 2021-11-21 ASSESSMENT — PATIENT HEALTH QUESTIONNAIRE - PHQ9
10. IF YOU CHECKED OFF ANY PROBLEMS, HOW DIFFICULT HAVE THESE PROBLEMS MADE IT FOR YOU TO DO YOUR WORK, TAKE CARE OF THINGS AT HOME, OR GET ALONG WITH OTHER PEOPLE: SOMEWHAT DIFFICULT
SUM OF ALL RESPONSES TO PHQ QUESTIONS 1-9: 11
SUM OF ALL RESPONSES TO PHQ QUESTIONS 1-9: 11

## 2021-11-22 ENCOUNTER — VIRTUAL VISIT (OUTPATIENT)
Dept: BEHAVIORAL HEALTH | Facility: CLINIC | Age: 26
End: 2021-11-22
Payer: COMMERCIAL

## 2021-11-22 DIAGNOSIS — F32.0 CURRENT MILD EPISODE OF MAJOR DEPRESSIVE DISORDER WITHOUT PRIOR EPISODE (H): ICD-10-CM

## 2021-11-22 DIAGNOSIS — F41.9 ANXIETY DISORDER, UNSPECIFIED TYPE: Primary | ICD-10-CM

## 2021-11-22 DIAGNOSIS — F90.9 ATTENTION DEFICIT HYPERACTIVITY DISORDER (ADHD), UNSPECIFIED ADHD TYPE: ICD-10-CM

## 2021-11-22 DIAGNOSIS — F43.9 TRAUMA AND STRESSOR-RELATED DISORDER: ICD-10-CM

## 2021-11-22 ASSESSMENT — PATIENT HEALTH QUESTIONNAIRE - PHQ9: SUM OF ALL RESPONSES TO PHQ QUESTIONS 1-9: 11

## 2021-11-22 NOTE — PROGRESS NOTES
Progress Note    Patient Name: John Jeter  Date: 2021         Service Type: Individual      Session Start Time: 5:30pm  Session End Time: 6:35pm     Session Length: 65 mins    Session #: 1    Attendees: Client attended alone    Service Modality:  Video Visit:      Provider verified identity through the following two step process.  Patient provided:  Patient  and Patient address    Telemedicine Visit: The patient's condition can be safely assessed and treated via synchronous audio and visual telemedicine encounter.      Reason for Telemedicine Visit: Services only offered telehealth    Originating Site (Patient Location): Patient's home    Distant Site (Provider Location): Provider Remote Setting- Home Office    Consent:  The patient/guardian has verbally consented to: the potential risks and benefits of telemedicine (video visit) versus in person care; bill my insurance or make self-payment for services provided; and responsibility for payment of non-covered services.     Patient would like the video invitation sent by:  My Chart    Mode of Communication:  Video Conference via Amwell    As the provider I attest to compliance with applicable laws and regulations related to telemedicine.     Treatment Plan Last Reviewed: n/a - initial session  PHQ-9 / HEIDI-7 :   PHQ 2021   PHQ-9 Total Score 5 8 11   Q9: Thoughts of better off dead/self-harm past 2 weeks Not at all Not at all Not at all     HEIDI-7 SCORE 2021   Total Score 7 (mild anxiety)   Total Score 7       DATA  Interactive Complexity: No  Crisis: No       Progress Since Last Session (Related to Symptoms / Goals / Homework):   Symptoms: n/a - initial session    Homework: n/a - initial session      Episode of Care Goals: Satisfactory progress - CONTEMPLATION (Considering change and yet undecided); Intervened by assessing the negative and positive thinking (ambivalence) about  behavior change     Current / Ongoing Stressors and Concerns:   Pt referred to Transition Clinic from [facility] to bridge gap in services until pt is established with outpatient therapist, SABRINA on 2/14/2022. Pt endorsed struggling with depression and anxiety at this time, as well as unresolved childhood trauma. Pt reports primary stressor at this time is some interpersonal conflict with friends over the past year. Pt denied having any suicidal ideation, self injurious behaviors, or homicidal ideation. Pt reports feeling safe at home, he lives with his fiance, and they are getting  in December 2021. Pt discussed impact of stressors at length, reviewed coping strategies, support network, and crisis resources.     Treatment Objective(s) Addressed in This Session:   Identify strategies to more effectively address stressors  Identify fears / thoughts that contribute to feeling anxious  Increase interest, engagement, and pleasure in doing things  Decrease frequency and intensity of feeling down, depressed, hopeless     Intervention:   Emotion Focused Therapy: focus on pt's emotions and identifying triggers that increase  sx   Solution Focused: discussed strategies to resolve interpersonal conflict and next  appropriate steps to take       ASSESSMENT: Current Emotional / Mental Status (status of significant symptoms):   Risk status (Self / Other harm or suicidal ideation)   Patient denies current fears or concerns for personal safety.   Patient denies current or recent suicidal ideation or behaviors.   Patient denies current or recent homicidal ideation or behaviors.   Patient denies current or recent self injurious behavior or ideation.   Patient denies other safety concerns.   Patient reports there has been no change in risk factors since their last session.     Patient reports there has been no change in protective factors since their last session.     Recommended that patient call 911 or go to the  local ED should there be a change in any of these risk factors.     Appearance:   Appropriate    Eye Contact:   Good    Psychomotor Behavior: Normal    Attitude:   Cooperative  Friendly Pleasant   Orientation:   All   Speech    Rate / Production: Normal/ Responsive Normal     Volume:  Normal    Mood:    Anxious  Depressed    Affect:    Appropriate  Tearful   Thought Content:  Clear  Rumination    Thought Form:  Coherent  Goal Directed  Logical    Insight:    Good      Medication Review:   No current psychiatric medications prescribed     Medication Compliance:   NA     Changes in Health Issues:   None reported     Chemical Use Review:   Substance Use: Chemical use reviewed, no active concerns identified      Tobacco Use: No current tobacco use.      Diagnosis:  1. Anxiety disorder, unspecified type    2. Current mild episode of major depressive disorder without prior episode (H)    3. Trauma and stressor-related disorder    4. Attention deficit hyperactivity disorder (ADHD), unspecified ADHD type        Collateral Reports Completed:   Chart Review    PLAN: (Patient Tasks / Therapist Tasks / Other)  Pt will focus on self care, use his support network, and reach out to friends to try to resolve interpersonal conflict. Pt will follow-up with the Transition Clinic weekly until he establishes care with outpatient therapist as scheduled with [facility] for 2/14/2022. Pt also has ADHD testing appointment scheduled at [facility] for 4/14/2022.        HUMPHREY Garner   11/22/2021

## 2021-11-30 ENCOUNTER — VIRTUAL VISIT (OUTPATIENT)
Dept: BEHAVIORAL HEALTH | Facility: CLINIC | Age: 26
End: 2021-11-30
Payer: COMMERCIAL

## 2021-11-30 DIAGNOSIS — F90.9 ATTENTION DEFICIT HYPERACTIVITY DISORDER (ADHD), UNSPECIFIED ADHD TYPE: ICD-10-CM

## 2021-11-30 DIAGNOSIS — F41.9 ANXIETY DISORDER, UNSPECIFIED TYPE: Primary | ICD-10-CM

## 2021-11-30 DIAGNOSIS — F43.9 TRAUMA AND STRESSOR-RELATED DISORDER: ICD-10-CM

## 2021-11-30 DIAGNOSIS — F32.0 CURRENT MILD EPISODE OF MAJOR DEPRESSIVE DISORDER WITHOUT PRIOR EPISODE (H): ICD-10-CM

## 2021-12-01 NOTE — PROGRESS NOTES
Progress Note    Patient Name: John Jeter  Date: 11/30/2021         Service Type: Individual      Session Start Time: 5:34pm  Session End Time: 6:34pm     Session Length: 60 mins    Session #: 2    Attendees: Client attended alone    Service Modality:  Video Visit:      Provider verified identity through the following two step process.  Patient provided:  Patient is known previously to provider    Telemedicine Visit: The patient's condition can be safely assessed and treated via synchronous audio and visual telemedicine encounter.      Reason for Telemedicine Visit: Services only offered telehealth    Originating Site (Patient Location): Patient's home    Distant Site (Provider Location): Provider Remote Setting- Home Office    Consent:  The patient/guardian has verbally consented to: the potential risks and benefits of telemedicine (video visit) versus in person care; bill my insurance or make self-payment for services provided; and responsibility for payment of non-covered services.     Patient would like the video invitation sent by:  My Chart    Mode of Communication:  Video Conference via Amwell    As the provider I attest to compliance with applicable laws and regulations related to telemedicine.     Treatment Plan Last Reviewed: to be reviewed next session  PHQ-9 / HEIDI-7 :   PHQ 4/26/2021 11/16/2021 11/21/2021   PHQ-9 Total Score 5 8 11   Q9: Thoughts of better off dead/self-harm past 2 weeks Not at all Not at all Not at all     HEIDI-7 SCORE 11/16/2021   Total Score 7 (mild anxiety)   Total Score 7         DATA  Interactive Complexity: No  Crisis: No       Progress Since Last Session (Related to Symptoms / Goals / Homework):   Symptoms: No change, pt continues to struggle with anxiety and depressed mood.    Homework: Achieved / completed to satisfaction      Episode of Care Goals: Satisfactory progress - ACTION (Actively working towards change); Intervened by  "reinforcing change plan / affirming steps taken     Current / Ongoing Stressors and Concerns:   Pt reports having had a good week and making some progress resolving conflict with his friends. Pt endorsed stress and anxiety related to the holidays and all the things left to do for his wedding coming up on 12/30/2021. Pt discussed his trauma history, family mental health history, dealing with grief of his uncle who passed away in August, and additional complex interpersonal relationship issues. Pt reports feeling like his \"brain is always going 300mph,\" as he is always assessing things, which causes increased anxiety. Reviewed coping strategies and identified areas to focus on for the week.     Treatment Objective(s) Addressed in This Session:   Identify strategies to more effectively address stressors  Identify fears / thoughts that contribute to feeling anxious  Increase interest, engagement, and pleasure in doing things  Decrease frequency and intensity of feeling down, depressed, hopeless     Intervention:   Emotion Focused Therapy: focus on pt's emotions and identifying triggers that increase sx              Solution Focused: identified areas to focus on and steps to making change        ASSESSMENT: Current Emotional / Mental Status (status of significant symptoms):   Risk status (Self / Other harm or suicidal ideation)   Patient denies current fears or concerns for personal safety.   Patient denies current or recent suicidal ideation or behaviors.   Patient denies current or recent homicidal ideation or behaviors.   Patient denies current or recent self injurious behavior or ideation.   Patient denies other safety concerns.   Patient reports there has been no change in risk factors since their last session.     Patient reports there has been no change in protective factors since their last session.     Recommended that patient call 911 or go to the local ED should there be a change in any of these risk " factors.     Appearance:   Appropriate    Eye Contact:   Good    Psychomotor Behavior: Normal    Attitude:   Cooperative    Orientation:   All   Speech    Rate / Production: Normal/ Responsive    Volume:  Normal    Mood:    Anxious  Normal Sad    Affect:    Appropriate    Thought Content:  Clear  Rumination    Thought Form:  Coherent  Goal Directed  Logical    Insight:    Good      Medication Review:   No current psychiatric medications prescribed     Medication Compliance:   NA     Changes in Health Issues:   None reported     Chemical Use Review:   Substance Use: Chemical use reviewed, no active concerns identified      Tobacco Use: No current tobacco use.      Diagnosis:  1. Anxiety disorder, unspecified type    2. Current mild episode of major depressive disorder without prior episode (H)    3. Trauma and stressor-related disorder    4. Attention deficit hyperactivity disorder (ADHD), unspecified ADHD type        Collateral Reports Completed:   Not Applicable    PLAN: (Patient Tasks / Therapist Tasks / Other)  Pt will focus on organizing and completing household tasks and continue to work on resolving interpersonal conflict with friend. Pt will follow-up with the Transition Clinic weekly until he establishes care with outpatient therapist as scheduled with [facility] for 2/14/2022. Pt also has ADHD testing appointment scheduled at [facility] for 4/14/2022.        HUMPHREY Garner   11/30/2021

## 2022-01-18 VITALS
HEART RATE: 70 BPM | BODY MASS INDEX: 35.96 KG/M2 | OXYGEN SATURATION: 98 % | TEMPERATURE: 98.6 F | DIASTOLIC BLOOD PRESSURE: 78 MMHG | RESPIRATION RATE: 16 BRPM | SYSTOLIC BLOOD PRESSURE: 115 MMHG | WEIGHT: 265.5 LBS | HEIGHT: 72 IN

## 2022-01-18 VITALS
RESPIRATION RATE: 14 BRPM | HEIGHT: 72 IN | WEIGHT: 279.8 LBS | BODY MASS INDEX: 37.9 KG/M2 | DIASTOLIC BLOOD PRESSURE: 60 MMHG | TEMPERATURE: 98.2 F | HEART RATE: 86 BPM | SYSTOLIC BLOOD PRESSURE: 104 MMHG | OXYGEN SATURATION: 95 %

## 2022-01-18 VITALS
WEIGHT: 297.7 LBS | RESPIRATION RATE: 14 BRPM | BODY MASS INDEX: 40.32 KG/M2 | SYSTOLIC BLOOD PRESSURE: 106 MMHG | DIASTOLIC BLOOD PRESSURE: 70 MMHG | HEIGHT: 72 IN | HEART RATE: 65 BPM | TEMPERATURE: 98.6 F | OXYGEN SATURATION: 97 %

## 2022-01-18 VITALS
RESPIRATION RATE: 18 BRPM | WEIGHT: 281.13 LBS | HEIGHT: 72 IN | TEMPERATURE: 98.2 F | BODY MASS INDEX: 38.08 KG/M2 | DIASTOLIC BLOOD PRESSURE: 74 MMHG | SYSTOLIC BLOOD PRESSURE: 106 MMHG | HEART RATE: 68 BPM

## 2022-01-18 ASSESSMENT — PATIENT HEALTH QUESTIONNAIRE - PHQ9: SUM OF ALL RESPONSES TO PHQ QUESTIONS 1-9: 5

## 2022-02-10 ENCOUNTER — TELEPHONE (OUTPATIENT)
Dept: BEHAVIORAL HEALTH | Facility: CLINIC | Age: 27
End: 2022-02-10
Payer: COMMERCIAL

## 2022-07-17 ENCOUNTER — HEALTH MAINTENANCE LETTER (OUTPATIENT)
Age: 27
End: 2022-07-17

## 2022-09-25 ENCOUNTER — HEALTH MAINTENANCE LETTER (OUTPATIENT)
Age: 27
End: 2022-09-25

## 2022-11-04 ENCOUNTER — TELEPHONE (OUTPATIENT)
Dept: PEDIATRICS | Facility: OTHER | Age: 27
End: 2022-11-04

## 2023-01-09 ENCOUNTER — VIRTUAL VISIT (OUTPATIENT)
Dept: INTERNAL MEDICINE | Facility: CLINIC | Age: 28
End: 2023-01-09
Payer: COMMERCIAL

## 2023-01-09 ENCOUNTER — TELEPHONE (OUTPATIENT)
Dept: NURSING | Facility: CLINIC | Age: 28
End: 2023-01-09

## 2023-01-09 DIAGNOSIS — U07.1 INFECTION DUE TO 2019 NOVEL CORONAVIRUS: Primary | ICD-10-CM

## 2023-01-09 PROCEDURE — 99213 OFFICE O/P EST LOW 20 MIN: CPT | Mod: GT | Performed by: PHYSICIAN ASSISTANT

## 2023-01-09 NOTE — PATIENT INSTRUCTIONS
COVID-19 Outpatient Treatments  Your care team can help you find the best treatments for COVID-19. Talk to a health care provider or refer to the FDA medicine fact sheets below.    Important: You can't have Paxlovid or molnupiravir if you're starting the medicine more than 5 days after your symptoms have started.  Paxlovid: https://www.fda.gov/media/560108/download  Molnupiravir (Lagevrio): https://www.fda.gov/media/358205/download  Paxlovid (nimatrelvir and ritonavir)  How it works  Two medicines (nirmatrelvir and ritonavir) are taken together. They stop the virus from growing. Less amount of virus is easier for your body to fight.  Benefits  Lowers risk of a hospital stay or death from COVID-19.  How to take    Medicine comes in a daily container with both medicine tablets. Take by mouth twice daily (once in the morning, once at night) for 5 days.    The number of tablets to take varies by patient.    Don't chew or break capsules. Swallow whole.  When to take  Take as soon as possible after positive COVID-19 test result, and within 5 days of your first symptoms.  Who can take it  Patients must be 12 years or older, weigh at least 88 pounds, and have tested positive for COVID-19. Paxlovid is the preferred treatment for pregnant patients.  Possible side effects  Can cause altered sense of taste, diarrhea (loose, watery stools), high blood pressure, muscle aches.  Medicine conflicts    Some medicines may conflict with Paxlovid and may cause serious side effects.    Tell your care team about all the medicines you take, including prescription and over-the-counter medicines, vitamins, and herbal supplements.    Your care team will review your medicines to make sure that you can safely take Paxlovid.  Cautions    Paxlovid is not advised for patients with severe kidney or liver disease. If you have kidney or liver problems, the dose may need to be changed.    If you're pregnant or breastfeeding, talk to your care team  about your options.    If you take hormonal birth control (such as the Pill), then you or your partner should also use a non-hormonal form of birth control (such as a condom). Keep doing this for 1 menstrual cycle after your last dose of Paxlovid.  Molnupiravir (Lagevrio)  How it works  Stops the virus from growing. Less amount of virus is easier for your body to fight.  Benefits  Lowers risk of a hospital stay or death from COVID-19.  How to take  Take 4 capsules by mouth every 12 hours (4 in the morning and 4 at night) for 5 days. Don't chew or break capsules. Swallow whole.  When to take  Take as soon as possible after positive COVID-19 test result, and within 5 days of your first symptoms.  Who can take it  Patients must be 18 years or older and have tested positive for COVID-19.  Possible side effects  Diarrhea (loose, watery stools), nausea (feeling sick to your stomach), dizziness, headaches.  Medicine conflicts  Right now, there are no known conflicts with other drugs. But tell your care team about all medicines you take.  Cautions    This medicine is not advised for patients who are pregnant.    If you are someone who could become pregnant, use trusted birth control until 4 days after your last dose of molnupiravir.    If your partner could become pregnant, you should use trusted birth control until 3 months after your last dose of molnupiravir.  For informational purposes only. Not to replace the advice of your health care provider. Copyright   2022 [facility]. All rights reserved. Clinically reviewed by Veronica Astorga, PharmD, BCACP. [employer] [id number] - REV 12/22.

## 2023-01-09 NOTE — PROGRESS NOTES
"John is a 27 year old who is being evaluated via a billable video visit.      How would you like to obtain your AVS? MyChart  If the video visit is dropped, the invitation should be resent by: Text to cell phone: [phone]  Will anyone else be joining your video visit? No          Assessment & Plan     Infection due to 2019 novel coronavirus    - nirmatrelvir and ritonavir (PAXLOVID) therapy pack; Take 3 tablets by mouth 2 times daily for 5 days (Take 2 Nirmatrelvir tablets and 1 Ritonavir tablet twice daily for 5 days)             Nicotine/Tobacco Cessation:  He reports that he has been smoking cigarettes. He has never used smokeless tobacco.  Nicotine/Tobacco Cessation Plan:   pER pcp      COVID-19 positive patient.  Encounter for consideration of medication intervention. Patient does qualify for a prescription. Full discussion with patient including medication options, risks and benefits. Potential drug interactions reviewed with patient.     Treatment Planned paxlovid to local pharmacy    Temporary change to home medications:  None     Estimated body mass index is 37.49 kg/m  as calculated from the following:    Height as of 6/17/21: 1.84 m (6' 0.44\").    Weight as of 6/17/21: 126.9 kg (279 lb 12.8 oz).  GFR Estimate   Date Value Ref Range Status   05/20/2021 >60 >60 mL/min/1.73m2 Final   04/05/2021 >90 >60 mL/min/[1.73_m2] Final     Comment:     Non  GFR Calc  Starting 12/18/2018, serum creatinine based estimated GFR (eGFR) will be   calculated using the Chronic Kidney Disease Epidemiology Collaboration   (CKD-EPI) equation.       No results found for: [id number]    No follow-ups on file.    Joi Chan PA-C  [facility]    Subjective   John is a 27 year old, presenting for the following health issues:  Covid treatment       HPI       COVID-19 Symptom Review  How many days ago did these symptoms start? 01/07/2023    Are any of the following symptoms " significant for you?    New or worsening difficulty breathing? No    Worsening cough? Yes, it's a dry cough.     Fever or chills? Yes, I felt feverish or had chills.    Headache: YES    Sore throat: YES    Chest pain: No    Diarrhea: YES    Body aches? YES    What treatments has patient tried? Dayquil   Does patient live in a nursing home, group home, or shelter? No  Does patient have a way to get food/medications during quarantined? Yes, I have a friend or family member who can help me.                  Review of Systems         Objective           Vitals:  No vitals were obtained today due to virtual visit.    Physical Exam   GENERAL: Healthy, alert and no distress  EYES: Eyes grossly normal to inspection.  No discharge or erythema, or obvious scleral/conjunctival abnormalities.  RESP: No audible wheeze, cough, or visible cyanosis.  No visible retractions or increased work of breathing.    SKIN: Visible skin clear. No significant rash, abnormal pigmentation or lesions.  NEURO: Cranial nerves grossly intact.  Mentation and speech appropriate for age.  PSYCH: Mentation appears normal, affect normal/bright, judgement and insight intact, normal speech and appearance well-groomed.                Video-Visit Details    Type of service:  Video Visit     Originating Location (pt. Location): Home    Distant Location (provider location):  Off-site  Platform used for Video Visit: [employer]

## 2023-08-05 ENCOUNTER — HEALTH MAINTENANCE LETTER (OUTPATIENT)
Age: 28
End: 2023-08-05

## 2024-09-28 ENCOUNTER — HEALTH MAINTENANCE LETTER (OUTPATIENT)
Age: 29
End: 2024-09-28